# Patient Record
Sex: MALE | Race: BLACK OR AFRICAN AMERICAN | Employment: FULL TIME | URBAN - METROPOLITAN AREA
[De-identification: names, ages, dates, MRNs, and addresses within clinical notes are randomized per-mention and may not be internally consistent; named-entity substitution may affect disease eponyms.]

---

## 2017-01-22 ENCOUNTER — HOSPITAL ENCOUNTER (EMERGENCY)
Age: 42
Discharge: HOME | End: 2017-01-22
Attending: EMERGENCY MEDICINE
Payer: COMMERCIAL

## 2017-01-22 PROCEDURE — 75810000275 HC EMERGENCY DEPT VISIT NO LEVEL OF CARE: Performed by: EMERGENCY MEDICINE

## 2018-08-21 ENCOUNTER — HOSPITAL ENCOUNTER (EMERGENCY)
Age: 43
Discharge: HOME | End: 2018-08-21
Attending: EMERGENCY MEDICINE
Payer: COMMERCIAL

## 2018-08-21 VITALS
BODY MASS INDEX: 31.5 KG/M2 | SYSTOLIC BLOOD PRESSURE: 138 MMHG | HEART RATE: 79 BPM | DIASTOLIC BLOOD PRESSURE: 89 MMHG | HEIGHT: 70 IN | WEIGHT: 220 LBS | OXYGEN SATURATION: 99 % | RESPIRATION RATE: 18 BRPM | TEMPERATURE: 98.3 F

## 2018-08-21 DIAGNOSIS — R73.9 HYPERGLYCEMIA: Primary | ICD-10-CM

## 2018-08-21 LAB
ANION GAP SERPL CALC-SCNC: 9 MMOL/L (ref 7–16)
BUN SERPL-MCNC: 13 MG/DL (ref 6–23)
CALCIUM SERPL-MCNC: 9 MG/DL (ref 8.3–10.4)
CHLORIDE SERPL-SCNC: 99 MMOL/L (ref 98–107)
CO2 SERPL-SCNC: 28 MMOL/L (ref 21–32)
CREAT SERPL-MCNC: 0.95 MG/DL (ref 0.8–1.5)
EST. AVERAGE GLUCOSE BLD GHB EST-MCNC: 237 MG/DL
GLUCOSE BLD STRIP.AUTO-MCNC: 286 MG/DL (ref 65–100)
GLUCOSE SERPL-MCNC: 279 MG/DL (ref 65–100)
HBA1C MFR BLD: 9.9 % (ref 4.8–6)
POTASSIUM SERPL-SCNC: 5 MMOL/L (ref 3.5–5.1)
SODIUM SERPL-SCNC: 136 MMOL/L (ref 136–145)

## 2018-08-21 PROCEDURE — 99283 EMERGENCY DEPT VISIT LOW MDM: CPT | Performed by: EMERGENCY MEDICINE

## 2018-08-21 PROCEDURE — 96374 THER/PROPH/DIAG INJ IV PUSH: CPT | Performed by: EMERGENCY MEDICINE

## 2018-08-21 PROCEDURE — 83036 HEMOGLOBIN GLYCOSYLATED A1C: CPT

## 2018-08-21 PROCEDURE — 80048 BASIC METABOLIC PNL TOTAL CA: CPT

## 2018-08-21 PROCEDURE — 82962 GLUCOSE BLOOD TEST: CPT

## 2018-08-21 PROCEDURE — 74011636637 HC RX REV CODE- 636/637: Performed by: EMERGENCY MEDICINE

## 2018-08-21 RX ORDER — METFORMIN HYDROCHLORIDE 500 MG/1
1000 TABLET ORAL 2 TIMES DAILY WITH MEALS
Qty: 120 TAB | Refills: 0 | Status: SHIPPED | OUTPATIENT
Start: 2018-08-21 | End: 2018-09-20

## 2018-08-21 RX ORDER — BACLOFEN 10 MG/1
TABLET ORAL 3 TIMES DAILY
COMMUNITY

## 2018-08-21 RX ORDER — GLUCOSAMINE SULFATE 1500 MG
POWDER IN PACKET (EA) ORAL DAILY
COMMUNITY

## 2018-08-21 RX ORDER — LANOLIN ALCOHOL/MO/W.PET/CERES
400 CREAM (GRAM) TOPICAL DAILY
COMMUNITY

## 2018-08-21 RX ORDER — AMLODIPINE AND VALSARTAN 10; 320 MG/1; MG/1
1 TABLET ORAL DAILY
COMMUNITY

## 2018-08-21 RX ADMIN — HUMAN INSULIN 10 UNITS: 100 INJECTION, SOLUTION SUBCUTANEOUS at 12:09

## 2018-08-21 NOTE — ED TRIAGE NOTES
Pt states he is diabetic, takes metformin but is still having high glucose levels. Pt states his glucose levels have been elevated for 2 weeks.

## 2018-08-21 NOTE — ED PROVIDER NOTES
[address] Emergency Department  Seen  @ [facility] EMERGENCY DEPT in room FT12/12    Chief Complaint   Patient presents with    High Blood Sugar       HPI:   Arnaldo Vides is a 37 y.o. male who complaints of elevated blood sugar. Patient was seen Dr. Jose Morejon who is retired. Unable to see a follow-up doctor. His blood sugars been elevated for the last 2 weeks. On metformin 500 mg twice a day. Historian: patient    Review of Systems:  No fever or chills. No chest pain or shortness of breath he does have polyuria polydipsia    Past Medical History:  Primary Care Doctor: Nico Mahajan MD (Inactive)    Past Medical History:   Diagnosis Date    Hypertension        History reviewed. No pertinent surgical history. Social History     Social History    Marital status:      Spouse name: N/A    Number of children: N/A    Years of education: N/A     Social History Main Topics    Smoking status: Never Smoker    Smokeless tobacco: Never Used    Alcohol use No    Drug use: No    Sexual activity: Not Asked     Other Topics Concern    None     Social History Narrative       Previous Medications    AMLODIPINE-VALSARTAN (EXFORGE)  MG PER TABLET    Take 1 Tab by mouth daily. BACLOFEN (LIORESAL) 10 MG TABLET    Take  by mouth three (3) times daily. CETIRIZINE (ZYRTEC) 10 MG CAP    Take  by mouth. CETIRIZINE 10 MG CAP    Take  by mouth. CHOLECALCIFEROL (VITAMIN D3) 1,000 UNIT CAP    Take  by mouth daily. FOLIC ACID 667 MCG TABLET    Take 400 mcg by mouth daily. METFORMIN (GLUCOPHAGE) 500 MG TABLET    Take 500 mg by mouth two (2) times daily (with meals). NEBIVOLOL (BYSTOLIC) 5 MG TABLET    Take 5 mg by mouth daily. Allergies   Allergen Reactions    Lisinopril Swelling       Physical Exam:    Vitals:    08/21/18 1049   BP: (!) 143/96   Pulse: 83   Resp: 16   Temp: 98.3 °F (36.8 °C)   SpO2: 98%     Vital signs were reviewed.   Pulse oximetry interpretation: normal    Constitutional: well appearing, nontoxic,   Cardiovascular: regular rate and rhythm, no murmur, 2+ radial pulses,    Respiratory: clear to auscultation with no wheezes, rales, ronchi,   Skin: dry, no rashes,   Neurologic: alert, oriented, answers questions follows commands,   Psychiatric: Speech pattern and content normal,      _____________________________________________________________________  Medical Decision Making: This is a new problem that does need additional workup  Labs/Radiographs/ECG were ordered: yes    The patient's problem is: minor  The Diagnostic Options are: minimal risk  The Management Options are: moderate risk    ______________________________________________________________________  ED Evaluation      No current facility-administered medications for this encounter. Current Outpatient Prescriptions   Medication Sig    Cetirizine (ZYRTEC) 10 mg cap Take  by mouth.  amLODIPine-valsartan (EXFORGE)  mg per tablet Take 1 Tab by mouth daily.  baclofen (LIORESAL) 10 mg tablet Take  by mouth three (3) times daily.  folic acid 267 mcg tablet Take 400 mcg by mouth daily.  cholecalciferol (VITAMIN D3) 1,000 unit cap Take  by mouth daily.  Cetirizine 10 mg cap Take  by mouth.  nebivolol (BYSTOLIC) 5 mg tablet Take 5 mg by mouth daily.  metFORMIN (GLUCOPHAGE) 500 mg tablet Take 500 mg by mouth two (2) times daily (with meals).       ==================================================================  ASSESSMENT: blood sugar elevated pt looks well, given insulin     PLAN: dc home, follow up with PCP  _____________________________________________________________________    Condition:  improved  Disposition:  home  Diagnosis:    Encounter Diagnoses     ICD-10-CM ICD-9-CM   1.  Hyperglycemia R73.9 790.29         Alma Delia Cardoza MD; 8/21/2018 @10:59 AM===========================================    ED Course

## 2018-08-21 NOTE — DISCHARGE INSTRUCTIONS
Learning About High Blood Sugar  What is high blood sugar? Your body turns the food you eat into glucose (sugar), which it uses for energy. But if your body isn't able to use the sugar right away, it can build up in your blood and lead to high blood sugar. When the amount of sugar in your blood stays too high for too much of the time, you may have diabetes. Diabetes is a disease that can cause serious health problems. The good news is that lifestyle changes may help you get your blood sugar back to normal and avoid or delay diabetes. What causes high blood sugar? Sugar (glucose) can build up in your blood if you:  · Are overweight. · Have a family history of diabetes. · Take certain medicines, such as steroids. What are the symptoms? Having high blood sugar may not cause any symptoms at all. Or it may make you feel very thirsty or very hungry. You may also urinate more often than usual, have blurry vision, or lose weight without trying. How is high blood sugar treated? You can take steps to lower your blood sugar level if you understand what makes it get higher. Your doctor may want you to learn how to test your blood sugar level at home. Then you can see how illness, stress, or different kinds of food or medicine raise or lower your blood sugar level. Other tests may be needed to see if you have diabetes. How can you prevent high blood sugar? · Watch your weight. If you're overweight, losing just a small amount of weight may help. Reducing fat around your waist is most important. · Limit the amount of calories, sweets, and unhealthy fat you eat. Ask your doctor if a dietitian can help you. A registered dietitian can help you create meal plans that fit your lifestyle. · Get at least 30 minutes of exercise on most days of the week. Exercise helps control your blood sugar. It also helps you maintain a healthy weight. Walking is a good choice.  You also may want to do other activities, such as running, swimming, cycling, or playing tennis or team sports. · If your doctor prescribed medicines, take them exactly as prescribed. Call your doctor if you think you are having a problem with your medicine. You will get more details on the specific medicines your doctor prescribes. Follow-up care is a key part of your treatment and safety. Be sure to make and go to all appointments, and call your doctor if you are having problems. It's also a good idea to know your test results and keep a list of the medicines you take. Where can you learn more? Go to http://dorothy-hakan.info/. Enter O108 in the search box to learn more about \"Learning About High Blood Sugar. \"  Current as of: December 7, 2017  Content Version: 11.7  © 9271-5710 [employer]. Care instructions adapted under license by Anita Margarita (which disclaims liability or warranty for this information). If you have questions about a medical condition or this instruction, always ask your healthcare professional. [address] any warranty or liability for your use of this information.

## 2018-12-25 ENCOUNTER — HOSPITAL ENCOUNTER (EMERGENCY)
Age: 43
Discharge: HOME | End: 2018-12-25
Attending: EMERGENCY MEDICINE
Payer: COMMERCIAL

## 2018-12-25 VITALS
HEIGHT: 70 IN | WEIGHT: 215 LBS | TEMPERATURE: 97.7 F | DIASTOLIC BLOOD PRESSURE: 84 MMHG | HEART RATE: 87 BPM | BODY MASS INDEX: 30.78 KG/M2 | SYSTOLIC BLOOD PRESSURE: 147 MMHG | RESPIRATION RATE: 18 BRPM | OXYGEN SATURATION: 98 %

## 2018-12-25 DIAGNOSIS — E11.65 TYPE 2 DIABETES MELLITUS WITH HYPERGLYCEMIA, UNSPECIFIED WHETHER LONG TERM INSULIN USE (HCC): ICD-10-CM

## 2018-12-25 DIAGNOSIS — I10 ESSENTIAL HYPERTENSION: Primary | ICD-10-CM

## 2018-12-25 LAB
ALBUMIN SERPL-MCNC: 3.8 G/DL (ref 3.5–5)
ALBUMIN/GLOB SERPL: 1.1 {RATIO}
ALP SERPL-CCNC: 117 U/L (ref 50–136)
ALT SERPL-CCNC: 40 U/L (ref 12–65)
ANION GAP SERPL CALC-SCNC: 7 MMOL/L
AST SERPL-CCNC: 21 U/L (ref 15–37)
BASOPHILS # BLD: 0 K/UL (ref 0–0.2)
BASOPHILS NFR BLD: 0 % (ref 0–2)
BILIRUB SERPL-MCNC: 0.4 MG/DL (ref 0.2–1.1)
BUN SERPL-MCNC: 13 MG/DL (ref 6–23)
CALCIUM SERPL-MCNC: 8.9 MG/DL (ref 8.3–10.4)
CHLORIDE SERPL-SCNC: 104 MMOL/L (ref 98–107)
CO2 SERPL-SCNC: 26 MMOL/L (ref 21–32)
CREAT SERPL-MCNC: 1.05 MG/DL (ref 0.8–1.5)
DIFFERENTIAL METHOD BLD: ABNORMAL
EOSINOPHIL # BLD: 0.4 K/UL (ref 0–0.8)
EOSINOPHIL NFR BLD: 6 % (ref 0.5–7.8)
ERYTHROCYTE [DISTWIDTH] IN BLOOD BY AUTOMATED COUNT: 13.8 % (ref 11.9–14.6)
GLOBULIN SER CALC-MCNC: 3.4 G/DL (ref 2.3–3.5)
GLUCOSE BLD STRIP.AUTO-MCNC: 219 MG/DL (ref 65–100)
GLUCOSE BLD STRIP.AUTO-MCNC: 352 MG/DL (ref 65–100)
GLUCOSE SERPL-MCNC: 336 MG/DL (ref 65–100)
HCT VFR BLD AUTO: 42.3 % (ref 41.1–50.3)
HGB BLD-MCNC: 14.2 G/DL (ref 13.6–17.2)
IMM GRANULOCYTES # BLD: 0 K/UL (ref 0–0.5)
IMM GRANULOCYTES NFR BLD AUTO: 0 % (ref 0–5)
LYMPHOCYTES # BLD: 3.2 K/UL (ref 0.5–4.6)
LYMPHOCYTES NFR BLD: 46 % (ref 13–44)
MCH RBC QN AUTO: 25 PG (ref 26.1–32.9)
MCHC RBC AUTO-ENTMCNC: 33.6 G/DL (ref 31.4–35)
MCV RBC AUTO: 74.6 FL (ref 79.6–97.8)
MONOCYTES # BLD: 0.5 K/UL (ref 0.1–1.3)
MONOCYTES NFR BLD: 7 % (ref 4–12)
NEUTS SEG # BLD: 2.8 K/UL (ref 1.7–8.2)
NEUTS SEG NFR BLD: 40 % (ref 43–78)
NRBC # BLD: 0 K/UL (ref 0–0.2)
PLATELET # BLD AUTO: 362 K/UL (ref 150–450)
PMV BLD AUTO: 9.5 FL (ref 9.4–12.3)
POTASSIUM SERPL-SCNC: 3.8 MMOL/L (ref 3.5–5.1)
PROT SERPL-MCNC: 7.2 G/DL
RBC # BLD AUTO: 5.67 M/UL (ref 4.23–5.6)
SODIUM SERPL-SCNC: 137 MMOL/L (ref 136–145)
WBC # BLD AUTO: 7 K/UL (ref 4.3–11.1)

## 2018-12-25 PROCEDURE — 74011250636 HC RX REV CODE- 250/636: Performed by: EMERGENCY MEDICINE

## 2018-12-25 PROCEDURE — 81003 URINALYSIS AUTO W/O SCOPE: CPT | Performed by: EMERGENCY MEDICINE

## 2018-12-25 PROCEDURE — 85025 COMPLETE CBC W/AUTO DIFF WBC: CPT

## 2018-12-25 PROCEDURE — 99284 EMERGENCY DEPT VISIT MOD MDM: CPT | Performed by: EMERGENCY MEDICINE

## 2018-12-25 PROCEDURE — 74011636637 HC RX REV CODE- 636/637: Performed by: EMERGENCY MEDICINE

## 2018-12-25 PROCEDURE — 82962 GLUCOSE BLOOD TEST: CPT

## 2018-12-25 PROCEDURE — 96374 THER/PROPH/DIAG INJ IV PUSH: CPT | Performed by: EMERGENCY MEDICINE

## 2018-12-25 PROCEDURE — 80053 COMPREHEN METABOLIC PANEL: CPT

## 2018-12-25 RX ORDER — SODIUM CHLORIDE 0.9 % (FLUSH) 0.9 %
5-10 SYRINGE (ML) INJECTION EVERY 8 HOURS
Status: DISCONTINUED | OUTPATIENT
Start: 2018-12-25 | End: 2018-12-25 | Stop reason: HOSPADM

## 2018-12-25 RX ORDER — SODIUM CHLORIDE 0.9 % (FLUSH) 0.9 %
5-10 SYRINGE (ML) INJECTION AS NEEDED
Status: DISCONTINUED | OUTPATIENT
Start: 2018-12-25 | End: 2018-12-25 | Stop reason: HOSPADM

## 2018-12-25 RX ORDER — METFORMIN HYDROCHLORIDE 500 MG/1
TABLET ORAL 2 TIMES DAILY WITH MEALS
COMMUNITY

## 2018-12-25 RX ADMIN — SODIUM CHLORIDE 1000 ML: 900 INJECTION, SOLUTION INTRAVENOUS at 19:34

## 2018-12-25 RX ADMIN — INSULIN HUMAN 6 UNITS: 100 INJECTION, SOLUTION PARENTERAL at 19:33

## 2018-12-25 NOTE — ED PROVIDER NOTES
Patient has a history of hypertension and diabetes, both of which are normally well controlled. He states his baseline. Blood sugar is 80-90. Today, after getting home from [employer], he started feeling dizzy and lightheaded with some blurred vision. He checked his blood pressure and it was 265/99. A family member called his doctor's office and he was told to come here for evaluation. He has not checked his blood sugar today. He has been taking his blood pressure medication and diabetes medications as prescribed. He states he is currently feeling better. Elements of this note were created using speech recognition software. As such, errors of speech recognition may be present. Past Medical History:   Diagnosis Date    Hypertension        History reviewed. No pertinent surgical history. History reviewed. No pertinent family history. Social History     Socioeconomic History    Marital status:      Spouse name: Not on file    Number of children: Not on file    Years of education: Not on file    Highest education level: Not on file   Social Needs    Financial resource strain: Not on file    Food insecurity - worry: Not on file    Food insecurity - inability: Not on file    Transportation needs - medical: Not on file   Decisiv needs - non-medical: Not on file   Occupational History    Not on file   Tobacco Use    Smoking status: Never Smoker    Smokeless tobacco: Never Used   Substance and Sexual Activity    Alcohol use: No    Drug use: No    Sexual activity: Not on file   Other Topics Concern    Not on file   Social History Narrative    Not on file         ALLERGIES: Lisinopril    Review of Systems   Constitutional: Negative for chills and fever. Gastrointestinal: Negative for nausea and vomiting. All other systems reviewed and are negative.       Vitals:    12/25/18 1758 12/25/18 1810   BP: (!) 141/95    Pulse: 85    Resp: 17    Temp: 97.7 °F (36.5 °C) SpO2: 98% 98%   Weight: 97.5 kg (215 lb)    Height: 5' 10\" (1.778 m)             Physical Exam   Constitutional: He is oriented to person, place, and time. He appears well-developed and well-nourished. HENT:   Head: Normocephalic and atraumatic. Eyes: Conjunctivae are normal. Pupils are equal, round, and reactive to light. Neck: Normal range of motion. Neck supple. Cardiovascular: Normal rate, regular rhythm and normal heart sounds. Pulmonary/Chest: Effort normal and breath sounds normal.   Abdominal: Soft. Bowel sounds are normal.   Musculoskeletal: Normal range of motion. He exhibits no edema. Neurological: He is alert and oriented to person, place, and time. Skin: Skin is warm and dry. Nursing note and vitals reviewed. MDM  Number of Diagnoses or Management Options  Essential hypertension: established and worsening  Type 2 diabetes mellitus with hyperglycemia, unspecified whether long term insulin use [facility]):   Diagnosis management comments: 8:32 PM Blood pressure has been in the 140s/80s his entire visit here.   We will recheck his blood sugar after his liter bolus and insulin       Amount and/or Complexity of Data Reviewed  Clinical lab tests: reviewed and ordered    Risk of Complications, Morbidity, and/or Mortality  Presenting problems: moderate  Diagnostic procedures: moderate  Management options: moderate    Patient Progress  Patient progress: improved         Procedures

## 2018-12-26 NOTE — ED NOTES
I have reviewed discharge instructions with the patient. The patient verbalized understanding. Patient left ED via Discharge Method: ambulatory to Home with spouse. Opportunity for questions and clarification provided. Patient given 0 scripts. To continue your aftercare when you leave the hospital, you may receive an automated call from our care team to check in on how you are doing. This is a free service and part of our promise to provide the best care and service to meet your aftercare needs.  If you have questions, or wish to unsubscribe from this service please call [phone]. Thank you for Choosing our [facility] Emergency Department.

## 2023-06-12 PROBLEM — M54.12 LEFT CERVICAL RADICULOPATHY: Status: ACTIVE | Noted: 2018-05-08

## 2023-06-12 PROBLEM — E55.9 VITAMIN D DEFICIENCY: Status: ACTIVE | Noted: 2017-10-04

## 2023-06-12 PROBLEM — R20.0 NUMBNESS AND TINGLING: Status: ACTIVE | Noted: 2018-07-10

## 2023-06-12 PROBLEM — R19.8 ALTERED BOWEL FUNCTION: Status: ACTIVE | Noted: 2022-11-15

## 2023-06-12 PROBLEM — G56.02 CARPAL TUNNEL SYNDROME, LEFT: Status: ACTIVE | Noted: 2018-05-08

## 2023-06-12 PROBLEM — H52.4 PRESBYOPIA: Status: ACTIVE | Noted: 2020-06-17

## 2023-06-12 PROBLEM — M25.561 CHRONIC PAIN OF RIGHT KNEE: Status: ACTIVE | Noted: 2018-01-05

## 2023-06-12 PROBLEM — D64.9 ANEMIA: Status: ACTIVE | Noted: 2021-03-04

## 2023-06-12 PROBLEM — M79.642 PAIN OF LEFT HAND: Status: ACTIVE | Noted: 2018-06-12

## 2023-06-12 PROBLEM — G89.29 CHRONIC PAIN OF RIGHT KNEE: Status: ACTIVE | Noted: 2018-01-05

## 2023-06-12 PROBLEM — R20.2 NUMBNESS AND TINGLING: Status: ACTIVE | Noted: 2018-07-10

## 2023-06-12 PROBLEM — G47.33 OSA (OBSTRUCTIVE SLEEP APNEA): Status: ACTIVE | Noted: 2017-03-30

## 2023-06-12 PROBLEM — A63.0 ANAL CONDYLOMA: Status: ACTIVE | Noted: 2022-11-15

## 2023-06-22 ENCOUNTER — OFFICE VISIT (OUTPATIENT)
Age: 48
End: 2023-06-22

## 2023-06-22 VITALS
HEIGHT: 69 IN | DIASTOLIC BLOOD PRESSURE: 86 MMHG | SYSTOLIC BLOOD PRESSURE: 132 MMHG | WEIGHT: 217 LBS | BODY MASS INDEX: 32.14 KG/M2 | HEART RATE: 98 BPM

## 2023-06-22 DIAGNOSIS — E78.5 DYSLIPIDEMIA: ICD-10-CM

## 2023-06-22 DIAGNOSIS — R07.2 PRECORDIAL PAIN: ICD-10-CM

## 2023-06-22 DIAGNOSIS — E11.9 TYPE 2 DIABETES MELLITUS WITHOUT COMPLICATION, WITHOUT LONG-TERM CURRENT USE OF INSULIN (HCC): ICD-10-CM

## 2023-06-22 DIAGNOSIS — I10 ESSENTIAL HYPERTENSION: Primary | ICD-10-CM

## 2023-06-22 RX ORDER — INSULIN GLARGINE 100 [IU]/ML
24 INJECTION, SOLUTION SUBCUTANEOUS DAILY
COMMUNITY
Start: 2023-06-09

## 2023-06-22 NOTE — PROGRESS NOTES
[address] [place], [place]  [address], [address] [facility], [address] [place] [address]  PHONE: [phone]    SUBJECTIVE: Mine Curry (1975) is a 50 y.o. male seen for a follow up visit regarding the following:   Specialty Problems          Cardiology Problems    Internal hemorrhoids        Essential hypertension        Hyperlipidemia         Pt was referred to the office after an episode of CP and lightheadedness that he went to the urgent care for on 6/12. Pt had never had anything like that before. He has a hx of T2DM on insulin, HTN and HLD. Pt endorses SOB and exertional dyspnea since the episode of CP, but no further CP. Tropinin and d-dimer was negative, EKG was nsr, glucose was elevated. Pt endorses some palpitations that wakes him up from his sleep, pt used to use his cPAP for sleep apnea, but it has been 2 years since he used it last. He is s/p sleep apnea surgery several years ago. Past Medical History, Past Surgical History, Family history, Social History, and Medications were all reviewed with the patient today and updated as necessary. Allergies   Allergen Reactions    Lisinopril Swelling     Past Medical History:   Diagnosis Date    Hypertension      No past surgical history on file. No family history on file.    Social History     Tobacco Use    Smoking status: Never    Smokeless tobacco: Never   Substance Use Topics    Alcohol use: No       Current Outpatient Medications:     insulin glargine (LANTUS SOLOSTAR) 100 UNIT/ML injection pen, Inject 24 Units into the skin daily, Disp: , Rfl:     amLODIPine-valsartan (EXFORGE)  MG per tablet, Take 1 tablet by mouth daily, Disp: , Rfl:     baclofen (LIORESAL) 10 MG tablet, Take by mouth 3 times daily, Disp: , Rfl:     Cetirizine HCl 10 MG CAPS, Take by mouth, Disp: , Rfl:     vitamin D 25 MCG (1000 UT) CAPS, Take by mouth daily, Disp: , Rfl:     metFORMIN (GLUCOPHAGE) 500 MG tablet, Take by mouth 2 times daily (with meals), Disp: , Rfl:

## 2023-07-05 ENCOUNTER — TELEPHONE (OUTPATIENT)
Age: 48
End: 2023-07-05

## 2023-07-11 ENCOUNTER — TELEPHONE (OUTPATIENT)
Age: 48
End: 2023-07-11

## 2023-07-11 NOTE — TELEPHONE ENCOUNTER
Nuclear stress test is normal no evidence of ischemia I see that the echo order is still pending and there does not appear to be an echo results yet done

## 2023-07-11 NOTE — TELEPHONE ENCOUNTER
Pt is calling for test results,he said he called last week and was told Dr Marichuy Baig has not seen the test yet. Pt is very upset and wants someone to call him with results today.   Please call pt at [phone]

## 2023-12-15 PROBLEM — R59.9 ENLARGED LYMPH NODE: Status: ACTIVE | Noted: 2023-12-15

## 2023-12-15 PROBLEM — G54.1: Status: ACTIVE | Noted: 2023-12-15

## 2023-12-15 PROBLEM — I26.99 PULMONARY EMBOLI (HCC): Status: ACTIVE | Noted: 2023-12-15

## 2023-12-15 PROBLEM — R52 UNCONTROLLED PAIN: Status: ACTIVE | Noted: 2023-12-15

## 2023-12-15 PROBLEM — G89.21 CHRONIC PAIN DUE TO TRAUMA: Status: ACTIVE | Noted: 2023-12-15

## 2023-12-15 PROBLEM — G89.29 CHRONIC INTRACTABLE PAIN: Status: ACTIVE | Noted: 2023-12-15

## 2023-12-15 PROBLEM — I16.0 HYPERTENSIVE URGENCY: Status: ACTIVE | Noted: 2023-12-15

## 2023-12-18 PROBLEM — R52 UNCONTROLLED PAIN: Status: RESOLVED | Noted: 2023-12-15 | Resolved: 2023-12-18

## 2023-12-18 PROBLEM — I16.0 HYPERTENSIVE URGENCY: Status: RESOLVED | Noted: 2023-12-15 | Resolved: 2023-12-18

## 2024-01-04 ENCOUNTER — TELEPHONE (OUTPATIENT)
Dept: FAMILY MEDICINE CLINIC | Facility: CLINIC | Age: 49
End: 2024-01-04

## 2024-01-04 ENCOUNTER — TELEPHONE (OUTPATIENT)
Dept: DIABETES SERVICES | Age: 49
End: 2024-01-04

## 2024-01-04 NOTE — TELEPHONE ENCOUNTER
Called patient about telephonic today and apologized for being late as was still with a patient.  He reports he wants to reschedule instead of doing education today.   Reports up all night in pain.  Rescheduled for January 17, 2023.

## 2024-01-04 NOTE — TELEPHONE ENCOUNTER
Dfm:    Welcome to [facility]! We can't wait to meet you!          MD Lucho Corley MD Karla Lester, PA-C Lina Lopez-Meneses, APRN, CNP     Our physicians, nurse practitioner, physician assistant, and care team are here to serve you.    Our office is conveniently located at: [facility] [address].    If you have any questions prior to your appointment, do not hesitate to call our office ([phone]).    [employer] Pre-Check allows you to check in prior to your appointment. This is quick and easy and will expedite your check-in process when you come to the office. Log into [employer] and under the visit select Pre-Check.  If you complete the Pre-Check on [employer] then when you arrive at the clinic you will only be asked your name and date of birth to verify your identity.  We appreciate and look forward to seeing you at your visit.    Sincerely,     [facility]

## 2024-01-17 ENCOUNTER — TELEPHONE (OUTPATIENT)
Dept: DIABETES SERVICES | Age: 49
End: 2024-01-17

## 2024-01-17 NOTE — TELEPHONE ENCOUNTER
Type 2.  Called patient this morning at number requested to call for Basic class education telephonic.  Did not answer and went to voicemail.  Left a message to call me back and gave my office line for him to call back for education or to reschedule if something has come up.

## 2024-02-15 ENCOUNTER — OFFICE VISIT (OUTPATIENT)
Dept: FAMILY MEDICINE CLINIC | Facility: CLINIC | Age: 49
End: 2024-02-15
Payer: COMMERCIAL

## 2024-02-15 VITALS
HEIGHT: 70 IN | OXYGEN SATURATION: 98 % | DIASTOLIC BLOOD PRESSURE: 86 MMHG | SYSTOLIC BLOOD PRESSURE: 128 MMHG | BODY MASS INDEX: 31.57 KG/M2 | HEART RATE: 87 BPM

## 2024-02-15 DIAGNOSIS — E11.65 TYPE 2 DIABETES MELLITUS WITH HYPERGLYCEMIA, WITH LONG-TERM CURRENT USE OF INSULIN (HCC): Chronic | ICD-10-CM

## 2024-02-15 DIAGNOSIS — Z79.4 TYPE 2 DIABETES MELLITUS WITH HYPERGLYCEMIA, WITH LONG-TERM CURRENT USE OF INSULIN (HCC): Chronic | ICD-10-CM

## 2024-02-15 DIAGNOSIS — R91.1 PULMONARY NODULE: ICD-10-CM

## 2024-02-15 DIAGNOSIS — S12.301S CLOSED NONDISPLACED FRACTURE OF FOURTH CERVICAL VERTEBRA, UNSPECIFIED FRACTURE MORPHOLOGY, SEQUELA: ICD-10-CM

## 2024-02-15 DIAGNOSIS — I10 ESSENTIAL HYPERTENSION: ICD-10-CM

## 2024-02-15 DIAGNOSIS — Z86.711 HISTORY OF PULMONARY EMBOLISM: Primary | ICD-10-CM

## 2024-02-15 PROBLEM — R20.0 LEFT SIDED NUMBNESS: Status: ACTIVE | Noted: 2023-08-27

## 2024-02-15 PROBLEM — M79.661 RIGHT CALF PAIN: Status: ACTIVE | Noted: 2023-11-27

## 2024-02-15 PROBLEM — R29.898 WEAKNESS OF BOTH LOWER EXTREMITIES: Status: ACTIVE | Noted: 2023-11-16

## 2024-02-15 PROBLEM — R53.1 WEAKNESS OF LEFT SIDE OF BODY: Status: ACTIVE | Noted: 2023-08-27

## 2024-02-15 PROBLEM — R00.0 SINUS TACHYCARDIA: Status: ACTIVE | Noted: 2023-09-06

## 2024-02-15 PROBLEM — S12.301D CLOSED NONDISPLACED FRACTURE OF FOURTH CERVICAL VERTEBRA WITH ROUTINE HEALING: Status: ACTIVE | Noted: 2023-10-29

## 2024-02-15 PROBLEM — M51.26 LUMBAR DISC HERNIATION: Status: ACTIVE | Noted: 2023-10-14

## 2024-02-15 PROBLEM — M25.532 ACUTE WRIST PAIN, LEFT: Status: ACTIVE | Noted: 2020-07-27

## 2024-02-15 PROBLEM — S12.300A C4 CERVICAL FRACTURE (HCC): Status: ACTIVE | Noted: 2023-08-27

## 2024-02-15 PROCEDURE — 99204 OFFICE O/P NEW MOD 45 MIN: CPT

## 2024-02-15 PROCEDURE — 3074F SYST BP LT 130 MM HG: CPT

## 2024-02-15 PROCEDURE — 3079F DIAST BP 80-89 MM HG: CPT

## 2024-02-15 RX ORDER — OXYCODONE AND ACETAMINOPHEN 7.5; 325 MG/1; MG/1
TABLET ORAL
COMMUNITY
Start: 2023-12-21

## 2024-02-15 RX ORDER — INSULIN GLARGINE 100 [IU]/ML
32 INJECTION, SOLUTION SUBCUTANEOUS NIGHTLY
Qty: 7 ADJUSTABLE DOSE PRE-FILLED PEN SYRINGE | Refills: 3 | Status: SHIPPED | OUTPATIENT
Start: 2024-02-15

## 2024-02-15 RX ORDER — INSULIN LISPRO 100 [IU]/ML
10 INJECTION, SOLUTION INTRAVENOUS; SUBCUTANEOUS
Qty: 10 ML | Refills: 3 | Status: SHIPPED | OUTPATIENT
Start: 2024-02-15

## 2024-02-15 RX ORDER — METOPROLOL SUCCINATE 50 MG/1
50 TABLET, EXTENDED RELEASE ORAL DAILY
Qty: 90 TABLET | Refills: 3 | Status: SHIPPED | OUTPATIENT
Start: 2024-02-15 | End: 2025-02-09

## 2024-02-15 RX ORDER — HYDRALAZINE HYDROCHLORIDE 50 MG/1
50 TABLET, FILM COATED ORAL DAILY
Qty: 90 TABLET | Refills: 3 | Status: SHIPPED | OUTPATIENT
Start: 2024-02-15 | End: 2025-02-09

## 2024-02-15 RX ORDER — HYDROCODONE BITARTRATE AND ACETAMINOPHEN 7.5; 325 MG/1; MG/1
TABLET ORAL
COMMUNITY
Start: 2023-12-20

## 2024-02-15 RX ORDER — OXYCODONE AND ACETAMINOPHEN 10; 325 MG/1; MG/1
TABLET ORAL
COMMUNITY
Start: 2024-01-16

## 2024-02-15 RX ORDER — AMLODIPINE AND VALSARTAN 10; 320 MG/1; MG/1
1 TABLET ORAL DAILY
Qty: 90 TABLET | Refills: 3 | Status: SHIPPED | OUTPATIENT
Start: 2024-02-15

## 2024-02-15 RX ORDER — NAPROXEN 500 MG/1
500 TABLET ORAL 2 TIMES DAILY PRN
Qty: 180 TABLET | Refills: 1 | Status: SHIPPED | OUTPATIENT
Start: 2024-02-15

## 2024-02-15 RX ORDER — INSULIN LISPRO 100 [IU]/ML
1-5 INJECTION, SOLUTION INTRAVENOUS; SUBCUTANEOUS
COMMUNITY
Start: 2024-01-02 | End: 2024-02-15 | Stop reason: SDUPTHER

## 2024-02-15 RX ORDER — INSULIN GLARGINE 100 [IU]/ML
32 INJECTION, SOLUTION SUBCUTANEOUS NIGHTLY
Qty: 10 ML | Status: CANCELLED | OUTPATIENT
Start: 2024-02-15

## 2024-02-15 NOTE — PROGRESS NOTES
compliance with the treatment plan as well as documenting on the day of the visit.    Signed By: Franci Conley, APRN - NP     February 15, 2024

## 2024-02-19 DIAGNOSIS — Z79.4 TYPE 2 DIABETES MELLITUS WITH HYPERGLYCEMIA, WITH LONG-TERM CURRENT USE OF INSULIN (HCC): Primary | ICD-10-CM

## 2024-02-19 DIAGNOSIS — E11.65 TYPE 2 DIABETES MELLITUS WITH HYPERGLYCEMIA, WITH LONG-TERM CURRENT USE OF INSULIN (HCC): Primary | ICD-10-CM

## 2024-02-19 RX ORDER — INSULIN GLARGINE 100 [IU]/ML
32 INJECTION, SOLUTION SUBCUTANEOUS NIGHTLY
Qty: 30 ML | Refills: 3 | Status: SHIPPED | OUTPATIENT
Start: 2024-02-19 | End: 2025-02-28

## 2024-02-21 ENCOUNTER — TELEPHONE (OUTPATIENT)
Dept: FAMILY MEDICINE CLINIC | Facility: CLINIC | Age: 49
End: 2024-02-21

## 2024-02-21 NOTE — TELEPHONE ENCOUNTER
lantus VIAL needs a PA but he got the lantus solostar pen at [facility] instead    Patient got this filled at [facility] [address] [facility]             insulin glargine (LANTUS SOLOSTAR) 100 UNIT/ML injection pen [[id number]]    Order Details  Dose: 32 Units Route: SubCUTAneous Frequency: NIGHTLY   Dispense Quantity: 7 Adjustable Dose Pre-filled Pen Syringe Refills: 3

## 2024-02-27 ENCOUNTER — HOSPITAL ENCOUNTER (EMERGENCY)
Age: 49
Discharge: HOME | End: 2024-02-27
Payer: OTHER MISCELLANEOUS

## 2024-02-27 VITALS
DIASTOLIC BLOOD PRESSURE: 87 MMHG | SYSTOLIC BLOOD PRESSURE: 156 MMHG | RESPIRATION RATE: 18 BRPM | WEIGHT: 225 LBS | TEMPERATURE: 98.2 F | HEART RATE: 110 BPM | HEIGHT: 70 IN | BODY MASS INDEX: 32.21 KG/M2 | OXYGEN SATURATION: 98 %

## 2024-02-27 DIAGNOSIS — M54.50 CHRONIC MIDLINE LOW BACK PAIN WITHOUT SCIATICA: Primary | ICD-10-CM

## 2024-02-27 DIAGNOSIS — G89.29 CHRONIC MIDLINE LOW BACK PAIN WITHOUT SCIATICA: Primary | ICD-10-CM

## 2024-02-27 PROCEDURE — 6360000002 HC RX W HCPCS: Performed by: PHYSICIAN ASSISTANT

## 2024-02-27 PROCEDURE — 6370000000 HC RX 637 (ALT 250 FOR IP): Performed by: PHYSICIAN ASSISTANT

## 2024-02-27 PROCEDURE — 99284 EMERGENCY DEPT VISIT MOD MDM: CPT

## 2024-02-27 PROCEDURE — 96372 THER/PROPH/DIAG INJ SC/IM: CPT

## 2024-02-27 RX ORDER — KETOROLAC TROMETHAMINE 30 MG/ML
60 INJECTION, SOLUTION INTRAMUSCULAR; INTRAVENOUS ONCE
Status: COMPLETED | OUTPATIENT
Start: 2024-02-27 | End: 2024-02-27

## 2024-02-27 RX ORDER — LIDOCAINE 4 G/G
1 PATCH TOPICAL
Status: DISCONTINUED | OUTPATIENT
Start: 2024-02-27 | End: 2024-02-27 | Stop reason: HOSPADM

## 2024-02-27 RX ORDER — MORPHINE SULFATE 15 MG/1
15 TABLET ORAL
Status: COMPLETED | OUTPATIENT
Start: 2024-02-27 | End: 2024-02-27

## 2024-02-27 RX ADMIN — MORPHINE SULFATE 15 MG: 15 TABLET ORAL at 18:09

## 2024-02-27 RX ADMIN — KETOROLAC TROMETHAMINE 60 MG: 30 INJECTION, SOLUTION INTRAMUSCULAR at 18:09

## 2024-02-27 ASSESSMENT — PAIN SCALES - GENERAL: PAINLEVEL_OUTOF10: 10

## 2024-02-27 ASSESSMENT — PAIN DESCRIPTION - FREQUENCY: FREQUENCY: CONTINUOUS

## 2024-02-27 ASSESSMENT — PAIN DESCRIPTION - ONSET: ONSET: ON-GOING

## 2024-02-27 ASSESSMENT — PAIN DESCRIPTION - PAIN TYPE: TYPE: ACUTE PAIN

## 2024-02-27 ASSESSMENT — LIFESTYLE VARIABLES
HOW MANY STANDARD DRINKS CONTAINING ALCOHOL DO YOU HAVE ON A TYPICAL DAY: PATIENT DOES NOT DRINK
HOW OFTEN DO YOU HAVE A DRINK CONTAINING ALCOHOL: NEVER

## 2024-02-27 ASSESSMENT — PAIN DESCRIPTION - LOCATION: LOCATION: BACK

## 2024-02-27 ASSESSMENT — PAIN - FUNCTIONAL ASSESSMENT: PAIN_FUNCTIONAL_ASSESSMENT: 0-10

## 2024-02-27 NOTE — ED PROVIDER NOTES
Never used   Substance and Sexual Activity   • Alcohol use: No   • Drug use: No     Social Determinants of Health     Financial Resource Strain: Low Risk  (2/15/2024)    Overall Financial Resource Strain (CARDIA)    • Difficulty of Paying Living Expenses: Not very hard   Food Insecurity: No Food Insecurity (2/15/2024)    Hunger Vital Sign    • Worried About Running Out of Food in the Last Year: Never true    • Ran Out of Food in the Last Year: Never true   Transportation Needs: Unmet Transportation Needs (2/15/2024)    PRAPARE - Transportation    • Lack of Transportation (Medical): Yes    • Lack of Transportation (Non-Medical): No   Physical Activity: Inactive (2/15/2024)    Exercise Vital Sign    • Days of Exercise per Week: 0 days    • Minutes of Exercise per Session: 0 min   Housing Stability: Low Risk  (2/15/2024)    Housing Stability Vital Sign    • Unable to Pay for Housing in the Last Year: No    • Number of Places Lived in the Last Year: 1    • Unstable Housing in the Last Year: No        Previous Medications    AMLODIPINE-VALSARTAN (EXFORGE)  MG PER TABLET    Take 1 tablet by mouth daily    APIXABAN (ELIQUIS) 5 MG TABS TABLET    Take 1 tablet by mouth 2 times daily    CETIRIZINE HCL 10 MG CAPS    Take by mouth    HYDRALAZINE (APRESOLINE) 50 MG TABLET    Take 1 tablet by mouth daily    HYDROCODONE-ACETAMINOPHEN (NORCO) 7.5-325 MG PER TABLET        INSULIN GLARGINE (LANTUS SOLOSTAR) 100 UNIT/ML INJECTION PEN    Inject 32 Units into the skin nightly    INSULIN GLARGINE (LANTUS) 100 UNIT/ML INJECTION VIAL    Inject 32 Units into the skin nightly    INSULIN LISPRO (HUMALOG) 100 UNIT/ML SOLN INJECTION VIAL    Inject 10 Units into the skin 3 times daily (with meals)    INSULIN PEN NEEDLE 32G X 4 MM MISC    1 each by Does not apply route daily    METFORMIN (GLUCOPHAGE) 500 MG TABLET    Take 1 tablet by mouth 2 times daily (with meals)    METOPROLOL SUCCINATE (TOPROL XL) 50 MG EXTENDED RELEASE TABLET    Take 1

## 2024-02-27 NOTE — DISCHARGE INSTRUCTIONS
Use warm, moist heat to area, massage, gentle range of motion and stretching to area. Use the medication as directed, ED if worse. Follow up with PCP and pain management for recheck and pain medicine refills.

## 2024-02-27 NOTE — ED TRIAGE NOTES
Pt arrives A&Ox4 ambulatory with walker. Pt reports falling down large flight of steel stairs in July. Pt reports increased pain today w/o re-injuring. Pt reports has been seen here before for concern and does have PCP.

## 2024-02-27 NOTE — ED NOTES
I have reviewed discharge instructions with the patient.  The patient verbalized understanding.    Patient left ED via Discharge Method: ambulatory to Home with (insert name of family/friend, self, transport via pov).    Opportunity for questions and clarification provided.       Patient given 0 scripts.         To continue your aftercare when you leave the hospital, you may receive an automated call from our care team to check in on how you are doing.  This is a free service and part of our promise to provide the best care and service to meet your aftercare needs.” If you have questions, or wish to unsubscribe from this service please call [phone].  Thank you for Choosing our [facility] Emergency Department.

## 2024-02-28 ENCOUNTER — HOSPITAL ENCOUNTER (EMERGENCY)
Age: 49
Discharge: HOME | End: 2024-02-28
Attending: EMERGENCY MEDICINE
Payer: OTHER MISCELLANEOUS

## 2024-02-28 VITALS
OXYGEN SATURATION: 97 % | WEIGHT: 225 LBS | DIASTOLIC BLOOD PRESSURE: 102 MMHG | HEIGHT: 70 IN | SYSTOLIC BLOOD PRESSURE: 158 MMHG | HEART RATE: 105 BPM | RESPIRATION RATE: 23 BRPM | TEMPERATURE: 98.4 F | BODY MASS INDEX: 32.21 KG/M2

## 2024-02-28 DIAGNOSIS — M54.50 CHRONIC LOW BACK PAIN, UNSPECIFIED BACK PAIN LATERALITY, UNSPECIFIED WHETHER SCIATICA PRESENT: Primary | ICD-10-CM

## 2024-02-28 DIAGNOSIS — Z86.711 HISTORY OF PULMONARY EMBOLISM: ICD-10-CM

## 2024-02-28 DIAGNOSIS — I10 HYPERTENSION, UNSPECIFIED TYPE: ICD-10-CM

## 2024-02-28 DIAGNOSIS — G89.29 CHRONIC LOW BACK PAIN, UNSPECIFIED BACK PAIN LATERALITY, UNSPECIFIED WHETHER SCIATICA PRESENT: Primary | ICD-10-CM

## 2024-02-28 DIAGNOSIS — I10 ESSENTIAL HYPERTENSION: ICD-10-CM

## 2024-02-28 LAB
ANION GAP SERPL CALC-SCNC: 8 MMOL/L (ref 2–11)
BUN SERPL-MCNC: 18 MG/DL (ref 6–23)
CALCIUM SERPL-MCNC: 9.2 MG/DL (ref 8.3–10.4)
CHLORIDE SERPL-SCNC: 105 MMOL/L (ref 103–113)
CO2 SERPL-SCNC: 24 MMOL/L (ref 21–32)
CREAT SERPL-MCNC: 1.03 MG/DL (ref 0.8–1.5)
EKG ATRIAL RATE: 113 BPM
EKG DIAGNOSIS: ABNORMAL
EKG P AXIS: 62 DEGREES
EKG P-R INTERVAL: 150 MS
EKG Q-T INTERVAL: 316 MS
EKG QRS DURATION: 79 MS
EKG QTC CALCULATION (BAZETT): 434 MS
EKG R AXIS: 54 DEGREES
EKG T AXIS: 34 DEGREES
EKG VENTRICULAR RATE: 113 BPM
GLUCOSE SERPL-MCNC: 177 MG/DL (ref 65–100)
POTASSIUM SERPL-SCNC: 4.5 MMOL/L (ref 3.5–5.1)
SODIUM SERPL-SCNC: 137 MMOL/L (ref 136–146)
TROPONIN I SERPL HS-MCNC: 3.6 PG/ML (ref 0–14)

## 2024-02-28 PROCEDURE — 96374 THER/PROPH/DIAG INJ IV PUSH: CPT

## 2024-02-28 PROCEDURE — 6360000002 HC RX W HCPCS: Performed by: EMERGENCY MEDICINE

## 2024-02-28 PROCEDURE — 84484 ASSAY OF TROPONIN QUANT: CPT

## 2024-02-28 PROCEDURE — 96375 TX/PRO/DX INJ NEW DRUG ADDON: CPT

## 2024-02-28 PROCEDURE — 6370000000 HC RX 637 (ALT 250 FOR IP): Performed by: EMERGENCY MEDICINE

## 2024-02-28 PROCEDURE — 2500000003 HC RX 250 WO HCPCS: Performed by: EMERGENCY MEDICINE

## 2024-02-28 PROCEDURE — 93005 ELECTROCARDIOGRAM TRACING: CPT | Performed by: EMERGENCY MEDICINE

## 2024-02-28 PROCEDURE — 80048 BASIC METABOLIC PNL TOTAL CA: CPT

## 2024-02-28 PROCEDURE — 99284 EMERGENCY DEPT VISIT MOD MDM: CPT

## 2024-02-28 RX ORDER — OXYCODONE HYDROCHLORIDE AND ACETAMINOPHEN 5; 325 MG/1; MG/1
2 TABLET ORAL
Status: COMPLETED | OUTPATIENT
Start: 2024-02-28 | End: 2024-02-28

## 2024-02-28 RX ORDER — HYDRALAZINE HYDROCHLORIDE 20 MG/ML
20 INJECTION INTRAMUSCULAR; INTRAVENOUS ONCE
Status: COMPLETED | OUTPATIENT
Start: 2024-02-28 | End: 2024-02-28

## 2024-02-28 RX ORDER — KETOROLAC TROMETHAMINE 30 MG/ML
30 INJECTION, SOLUTION INTRAMUSCULAR; INTRAVENOUS
Status: COMPLETED | OUTPATIENT
Start: 2024-02-28 | End: 2024-02-28

## 2024-02-28 RX ORDER — METAXALONE 800 MG/1
400-800 TABLET ORAL 3 TIMES DAILY
Qty: 20 TABLET | Refills: 0 | Status: SHIPPED | OUTPATIENT
Start: 2024-02-28

## 2024-02-28 RX ORDER — HYDRALAZINE HYDROCHLORIDE 50 MG/1
50 TABLET, FILM COATED ORAL 4 TIMES DAILY
Qty: 120 TABLET | Refills: 3 | Status: SHIPPED | OUTPATIENT
Start: 2024-02-28 | End: 2025-02-22

## 2024-02-28 RX ORDER — CYCLOBENZAPRINE HCL 10 MG
10 TABLET ORAL
Status: COMPLETED | OUTPATIENT
Start: 2024-02-28 | End: 2024-02-28

## 2024-02-28 RX ORDER — METOPROLOL SUCCINATE 50 MG/1
100 TABLET, EXTENDED RELEASE ORAL DAILY
Qty: 60 TABLET | Refills: 3 | Status: SHIPPED | OUTPATIENT
Start: 2024-02-28 | End: 2025-02-22

## 2024-02-28 RX ORDER — METOPROLOL TARTRATE 1 MG/ML
5 INJECTION, SOLUTION INTRAVENOUS
Status: COMPLETED | OUTPATIENT
Start: 2024-02-28 | End: 2024-02-28

## 2024-02-28 RX ORDER — GABAPENTIN 300 MG/1
300 CAPSULE ORAL 3 TIMES DAILY
Qty: 90 CAPSULE | Refills: 1 | Status: SHIPPED | OUTPATIENT
Start: 2024-02-28 | End: 2024-05-28

## 2024-02-28 RX ADMIN — KETOROLAC TROMETHAMINE 30 MG: 30 INJECTION INTRAMUSCULAR; INTRAVENOUS at 05:20

## 2024-02-28 RX ADMIN — HYDRALAZINE HYDROCHLORIDE 20 MG: 20 INJECTION, SOLUTION INTRAMUSCULAR; INTRAVENOUS at 05:21

## 2024-02-28 RX ADMIN — CYCLOBENZAPRINE 10 MG: 10 TABLET, FILM COATED ORAL at 05:18

## 2024-02-28 RX ADMIN — METOPROLOL TARTRATE 5 MG: 5 INJECTION INTRAVENOUS at 05:53

## 2024-02-28 RX ADMIN — OXYCODONE HYDROCHLORIDE AND ACETAMINOPHEN 2 TABLET: 5; 325 TABLET ORAL at 05:01

## 2024-02-28 ASSESSMENT — PAIN - FUNCTIONAL ASSESSMENT: PAIN_FUNCTIONAL_ASSESSMENT: 0-10

## 2024-02-28 ASSESSMENT — ENCOUNTER SYMPTOMS
FACIAL SWELLING: 0
ABDOMINAL PAIN: 0
BACK PAIN: 1
COUGH: 0
DIARRHEA: 0
NAUSEA: 0
EYE DISCHARGE: 0
RHINORRHEA: 0
VOMITING: 0
COLOR CHANGE: 0
SHORTNESS OF BREATH: 0
EYE REDNESS: 0

## 2024-02-28 ASSESSMENT — PAIN SCALES - GENERAL
PAINLEVEL_OUTOF10: 10
PAINLEVEL_OUTOF10: 10

## 2024-02-28 ASSESSMENT — LIFESTYLE VARIABLES
HOW OFTEN DO YOU HAVE A DRINK CONTAINING ALCOHOL: NEVER
HOW MANY STANDARD DRINKS CONTAINING ALCOHOL DO YOU HAVE ON A TYPICAL DAY: PATIENT DOES NOT DRINK

## 2024-02-28 NOTE — ED PROVIDER NOTES
Emergency Department Provider Note       PCP: Franci Conley, KIERRA - NP   Age: 48 y.o.   Sex: male     DISPOSITION       No diagnosis found.    Medical Decision Making     Chronic back pain, out of his pain medicines for dubious/quizzical reasons.  Patient worried about the effect this is having on his blood pressure.  Will give 1 dose of his standard Percocet 10, some hydralazine IV for his blood pressure with the intention that his dose should be bumped up at home.  Patient will need to follow-up with his PCP or pain management doctor for additional medicines.  ED Course as of 02/28/24 0636   Wed Feb 28, 2024   0615 Patient hesitant to leave stating that he will be back in the same straight since the pain increases in his blood pressure and heart rate go up.  I do see that he was admitted once in December for similar scenario for pain and vital sign control.  I will reach out to the hospitalist to see if they would consider putting him in the hospital again. [JF]   0632 Patient refusing to leave, suggesting that I \"call the police\" so he and his wife can video's \"forcing a crippled man out\".  Patient threatening to ayesha and report me to [place]. [JF]   0635 Wife at bedside I have tried to explain how his heart rate and blood pressure are controllable using hydralazine and Lopressor which she is already on at insufficient doses.  Patient continually interrupting, as I tried to explain that he is not currently in any danger. [JF]      ED Course User Index  [JF] Walt Patel MD     1 or more chronic illnesses with a severe exacerbation or progression.  Prescription drug management performed.  Patient was discharged risks and benefits of hospitalization were considered.  Discussion with external consultants.  Chronic medical problems impacting care include chronic pain.    I independently ordered and reviewed each unique test.  I reviewed external records: ED visit note from an outside group.  I

## 2024-02-28 NOTE — ED NOTES
Day shift Bola MARIE, refused to see pt stating that per MD Patel pt was discharged. Spoke to pt and his wife and he agreed to be discharged and go to another facility for re-evaluation. Assisted pt to car. Pt used walker to walk with stand by assistance.

## 2024-02-28 NOTE — ED NOTES
Pt refusing to leave. Pt states he will not leave until either given a script for pain medication or admitted to the hospital for pain management. Pt states he accidentally threw away the rest of his oxycodone and does not get a refill until next Wednesday. When Dr. Patel went in to speak to the pt, pt and his wife became very upset, stating that the MD did not care about the patient's pain and or health. Pt and wife asked what they are to do about his pain until Wednesday to which Dr. Patel replied \"Endure it and take the neurontin prescribed as well as additional medication to control your pulse and blood pressure.\" Pt states that MD will have to call the police to get him to leave. Security called. Two security officers at bedside. Pt continued to get more upset and broke down into tears. Pt states that security came in and was disrespectful calling him a liar and a drug seeker. (This RN was not in the room to validate this conversation.) CLARENCE Rocha Nurse Supervisor called and it was agreed that pt wait to see/speak to day shift ER attending and hospitalist for a second opinion. Security refused to leave outside pt room despite this RN telling them that they are no longer needed. This RN returned to pt room to speak to pt and his wife. Agreement made to wait until second opinion given by day shift Md's. Informed pt that this RN would update day shift charge nurse on current situation.

## 2024-02-28 NOTE — DISCHARGE INSTRUCTIONS
You will need to follow-up with your pain management group or your primary care doctor for any additional pain pills    Try the skelaxin muscle relaxer to aid with back pain relief  Start the Neurontin to aid with your back pain    Increase your hydralazine to 3, or even 4 times a day.  When this was initially prescribed in December it was supposed to be twice a day, it appears from your current prescription you are only taking it once a day.    Gradually increase the hydralazine frequency to 3 or 4 times a day for blood pressure control  If your heart rate stays high you can take an extra 50 of Toprol/metoprolol/Lopressor

## 2024-02-28 NOTE — ED NOTES
I have reviewed discharge instructions with the patient.  The patient verbalized understanding.    Patient left ED via Discharge Method: ambulatory to Home with wife.    Opportunity for questions and clarification provided.       Patient given 3 scripts.         To continue your aftercare when you leave the hospital, you may receive an automated call from our care team to check in on how you are doing.  This is a free service and part of our promise to provide the best care and service to meet your aftercare needs.” If you have questions, or wish to unsubscribe from this service please call [phone].  Thank you for Choosing our [facility] Emergency Department.

## 2024-03-18 ENCOUNTER — NURSE ONLY (OUTPATIENT)
Dept: FAMILY MEDICINE CLINIC | Facility: CLINIC | Age: 49
End: 2024-03-18

## 2024-03-18 DIAGNOSIS — Z79.4 TYPE 2 DIABETES MELLITUS WITH HYPERGLYCEMIA, WITH LONG-TERM CURRENT USE OF INSULIN (HCC): Chronic | ICD-10-CM

## 2024-03-18 DIAGNOSIS — E11.65 TYPE 2 DIABETES MELLITUS WITH HYPERGLYCEMIA, WITH LONG-TERM CURRENT USE OF INSULIN (HCC): Chronic | ICD-10-CM

## 2024-03-18 DIAGNOSIS — I10 ESSENTIAL HYPERTENSION: ICD-10-CM

## 2024-03-18 LAB
ALBUMIN SERPL-MCNC: 4.2 G/DL (ref 3.5–5)
ALBUMIN/GLOB SERPL: 1.2 (ref 0.4–1.6)
ALP SERPL-CCNC: 112 U/L (ref 50–136)
ALT SERPL-CCNC: 55 U/L (ref 12–65)
ANION GAP SERPL CALC-SCNC: 5 MMOL/L (ref 2–11)
AST SERPL-CCNC: 24 U/L (ref 15–37)
BASOPHILS # BLD: 0 K/UL (ref 0–0.2)
BASOPHILS NFR BLD: 1 % (ref 0–2)
BILIRUB SERPL-MCNC: 0.7 MG/DL (ref 0.2–1.1)
BUN SERPL-MCNC: 14 MG/DL (ref 6–23)
CALCIUM SERPL-MCNC: 9.9 MG/DL (ref 8.3–10.4)
CHLORIDE SERPL-SCNC: 105 MMOL/L (ref 103–113)
CHOLEST SERPL-MCNC: 167 MG/DL
CO2 SERPL-SCNC: 27 MMOL/L (ref 21–32)
CREAT SERPL-MCNC: 1 MG/DL (ref 0.8–1.5)
DIFFERENTIAL METHOD BLD: ABNORMAL
EOSINOPHIL # BLD: 0.2 K/UL (ref 0–0.8)
EOSINOPHIL NFR BLD: 2 % (ref 0.5–7.8)
ERYTHROCYTE [DISTWIDTH] IN BLOOD BY AUTOMATED COUNT: 14.6 % (ref 11.9–14.6)
EST. AVERAGE GLUCOSE BLD GHB EST-MCNC: 209 MG/DL
GLOBULIN SER CALC-MCNC: 3.6 G/DL (ref 2.8–4.5)
GLUCOSE SERPL-MCNC: 130 MG/DL (ref 65–100)
HBA1C MFR BLD: 8.9 % (ref 4.8–5.6)
HCT VFR BLD AUTO: 44.7 % (ref 41.1–50.3)
HDLC SERPL-MCNC: 49 MG/DL (ref 40–60)
HDLC SERPL: 3.4
HGB BLD-MCNC: 14.8 G/DL (ref 13.6–17.2)
IMM GRANULOCYTES # BLD AUTO: 0 K/UL (ref 0–0.5)
IMM GRANULOCYTES NFR BLD AUTO: 0 % (ref 0–5)
LDLC SERPL CALC-MCNC: 101.6 MG/DL
LYMPHOCYTES # BLD: 2.9 K/UL (ref 0.5–4.6)
LYMPHOCYTES NFR BLD: 39 % (ref 13–44)
MCH RBC QN AUTO: 23.8 PG (ref 26.1–32.9)
MCHC RBC AUTO-ENTMCNC: 33.1 G/DL (ref 31.4–35)
MCV RBC AUTO: 71.9 FL (ref 82–102)
MONOCYTES # BLD: 0.6 K/UL (ref 0.1–1.3)
MONOCYTES NFR BLD: 7 % (ref 4–12)
NEUTS SEG # BLD: 3.8 K/UL (ref 1.7–8.2)
NEUTS SEG NFR BLD: 51 % (ref 43–78)
NRBC # BLD: 0 K/UL (ref 0–0.2)
PLATELET # BLD AUTO: 454 K/UL (ref 150–450)
PMV BLD AUTO: 9.5 FL (ref 9.4–12.3)
POTASSIUM SERPL-SCNC: 4.3 MMOL/L (ref 3.5–5.1)
PROT SERPL-MCNC: 7.8 G/DL (ref 6.3–8.2)
RBC # BLD AUTO: 6.22 M/UL (ref 4.23–5.6)
SODIUM SERPL-SCNC: 137 MMOL/L (ref 136–146)
TRIGL SERPL-MCNC: 82 MG/DL (ref 35–150)
VLDLC SERPL CALC-MCNC: 16.4 MG/DL (ref 6–23)
WBC # BLD AUTO: 7.5 K/UL (ref 4.3–11.1)

## 2024-03-20 DIAGNOSIS — Z79.4 TYPE 2 DIABETES MELLITUS WITH HYPERGLYCEMIA, WITH LONG-TERM CURRENT USE OF INSULIN (HCC): ICD-10-CM

## 2024-03-20 DIAGNOSIS — E11.65 TYPE 2 DIABETES MELLITUS WITH HYPERGLYCEMIA, WITH LONG-TERM CURRENT USE OF INSULIN (HCC): ICD-10-CM

## 2024-03-20 DIAGNOSIS — E11.65 TYPE 2 DIABETES MELLITUS WITH HYPERGLYCEMIA, WITH LONG-TERM CURRENT USE OF INSULIN (HCC): Chronic | ICD-10-CM

## 2024-03-20 DIAGNOSIS — Z79.4 TYPE 2 DIABETES MELLITUS WITH HYPERGLYCEMIA, WITH LONG-TERM CURRENT USE OF INSULIN (HCC): Chronic | ICD-10-CM

## 2024-03-20 RX ORDER — INSULIN GLARGINE 100 [IU]/ML
35 INJECTION, SOLUTION SUBCUTANEOUS NIGHTLY
Qty: 7 ADJUSTABLE DOSE PRE-FILLED PEN SYRINGE | Refills: 5 | Status: SHIPPED | OUTPATIENT
Start: 2024-03-20

## 2024-03-20 RX ORDER — INSULIN GLARGINE 100 [IU]/ML
32 INJECTION, SOLUTION SUBCUTANEOUS NIGHTLY
Qty: 30 ML | Refills: 3 | OUTPATIENT
Start: 2024-03-20 | End: 2025-03-30

## 2024-03-27 ENCOUNTER — OFFICE VISIT (OUTPATIENT)
Dept: FAMILY MEDICINE CLINIC | Facility: CLINIC | Age: 49
End: 2024-03-27
Payer: COMMERCIAL

## 2024-03-27 VITALS
RESPIRATION RATE: 16 BRPM | OXYGEN SATURATION: 97 % | SYSTOLIC BLOOD PRESSURE: 132 MMHG | WEIGHT: 227 LBS | TEMPERATURE: 97.6 F | HEART RATE: 119 BPM | DIASTOLIC BLOOD PRESSURE: 86 MMHG | BODY MASS INDEX: 32.5 KG/M2 | HEIGHT: 70 IN

## 2024-03-27 DIAGNOSIS — Z79.4 TYPE 2 DIABETES MELLITUS WITH HYPERGLYCEMIA, WITH LONG-TERM CURRENT USE OF INSULIN (HCC): Chronic | ICD-10-CM

## 2024-03-27 DIAGNOSIS — E11.65 TYPE 2 DIABETES MELLITUS WITH HYPERGLYCEMIA, WITH LONG-TERM CURRENT USE OF INSULIN (HCC): Chronic | ICD-10-CM

## 2024-03-27 DIAGNOSIS — R79.89 ABNORMAL CBC: Primary | ICD-10-CM

## 2024-03-27 LAB
BASOPHILS # BLD: 0 K/UL (ref 0–0.2)
BASOPHILS NFR BLD: 1 % (ref 0–2)
DIFFERENTIAL METHOD BLD: ABNORMAL
EOSINOPHIL # BLD: 0.2 K/UL (ref 0–0.8)
EOSINOPHIL NFR BLD: 3 % (ref 0.5–7.8)
ERYTHROCYTE [DISTWIDTH] IN BLOOD BY AUTOMATED COUNT: 14.6 % (ref 11.9–14.6)
HCT VFR BLD AUTO: 42.2 % (ref 41.1–50.3)
HGB BLD-MCNC: 14.2 G/DL (ref 13.6–17.2)
IMM GRANULOCYTES # BLD AUTO: 0.1 K/UL (ref 0–0.5)
IMM GRANULOCYTES NFR BLD AUTO: 1 % (ref 0–5)
LYMPHOCYTES # BLD: 3 K/UL (ref 0.5–4.6)
LYMPHOCYTES NFR BLD: 37 % (ref 13–44)
MCH RBC QN AUTO: 23.9 PG (ref 26.1–32.9)
MCHC RBC AUTO-ENTMCNC: 33.6 G/DL (ref 31.4–35)
MCV RBC AUTO: 71 FL (ref 82–102)
MONOCYTES # BLD: 0.8 K/UL (ref 0.1–1.3)
MONOCYTES NFR BLD: 10 % (ref 4–12)
NEUTS SEG # BLD: 4 K/UL (ref 1.7–8.2)
NEUTS SEG NFR BLD: 48 % (ref 43–78)
NRBC # BLD: 0 K/UL (ref 0–0.2)
PLATELET # BLD AUTO: 399 K/UL (ref 150–450)
PMV BLD AUTO: 9.7 FL (ref 9.4–12.3)
RBC # BLD AUTO: 5.94 M/UL (ref 4.23–5.6)
WBC # BLD AUTO: 8.1 K/UL (ref 4.3–11.1)

## 2024-03-27 PROCEDURE — 99213 OFFICE O/P EST LOW 20 MIN: CPT

## 2024-03-27 PROCEDURE — 3075F SYST BP GE 130 - 139MM HG: CPT

## 2024-03-27 PROCEDURE — 3079F DIAST BP 80-89 MM HG: CPT

## 2024-03-27 PROCEDURE — 3052F HG A1C>EQUAL 8.0%<EQUAL 9.0%: CPT

## 2024-03-27 RX ORDER — INSULIN GLARGINE 100 [IU]/ML
35 INJECTION, SOLUTION SUBCUTANEOUS NIGHTLY
Qty: 7 ADJUSTABLE DOSE PRE-FILLED PEN SYRINGE | Refills: 5 | Status: SHIPPED | OUTPATIENT
Start: 2024-03-27

## 2024-03-27 ASSESSMENT — ENCOUNTER SYMPTOMS
ALLERGIC/IMMUNOLOGIC NEGATIVE: 1
GASTROINTESTINAL NEGATIVE: 1
EYES NEGATIVE: 1
BACK PAIN: 1
RESPIRATORY NEGATIVE: 1

## 2024-03-27 NOTE — PROGRESS NOTES
.......    Subjective:     Bob Gonzales 1975 is a 48 y.o. male Established patient, here for evaluation of the following:   Chief Complaint   Patient presents with    Annual Exam       Mr. Rojas presents to the clinic to have CBC redrawn as RBC and platelet count was elevated last time. His pulse rate is high because he walked in from the car to the room and aggravated his pain a lot. He has not been sick, has not had SOB, issues with urination, cough, headache.   He continues to see neurosurgery and pain management. He has a home aid and he is working with physical therapy weekly. He has had no low blood sugars, did tell him to make sure he is checking blood sugars before and after PT.        Past Medical History:   Diagnosis Date    Asthma     Erectile dysfunction     Hx of blood clots     Hyperlipidemia     Hypertension     Obesity     Sleep apnea     Type 2 diabetes mellitus without complication (HCC)      History reviewed. No pertinent surgical history.   History reviewed. No pertinent family history.  Social History     Tobacco Use    Smoking status: Never    Smokeless tobacco: Never   Substance Use Topics    Alcohol use: No      Current Outpatient Medications   Medication Sig Dispense Refill    insulin glargine (LANTUS SOLOSTAR) 100 UNIT/ML injection pen Inject 35 Units into the skin nightly 7 Adjustable Dose Pre-filled Pen Syringe 5    hydrALAZINE (APRESOLINE) 50 MG tablet Take 1 tablet by mouth 4 times daily You can take this up to 4 times a day roughly every 6 hours if needed, 120 tablet 3    metaxalone (SKELAXIN) 800 MG tablet Take 0.5-1 tablets by mouth 3 times daily 20 tablet 0    metoprolol succinate (TOPROL XL) 50 MG extended release tablet Take 2 tablets by mouth daily 60 tablet 3    oxyCODONE-acetaminophen (PERCOCET)  MG per tablet       naproxen (NAPROSYN) 500 MG tablet Take 1 tablet by mouth 2 times daily as needed for Pain 180 tablet 1    amLODIPine-valsartan (EXFORGE)  MG

## 2024-04-09 ENCOUNTER — OFFICE VISIT (OUTPATIENT)
Dept: PULMONOLOGY | Age: 49
End: 2024-04-09
Payer: COMMERCIAL

## 2024-04-09 VITALS
HEART RATE: 107 BPM | BODY MASS INDEX: 32.5 KG/M2 | WEIGHT: 227 LBS | DIASTOLIC BLOOD PRESSURE: 102 MMHG | OXYGEN SATURATION: 97 % | HEIGHT: 70 IN | RESPIRATION RATE: 18 BRPM | TEMPERATURE: 98.1 F | SYSTOLIC BLOOD PRESSURE: 164 MMHG

## 2024-04-09 DIAGNOSIS — G47.33 OSA (OBSTRUCTIVE SLEEP APNEA): ICD-10-CM

## 2024-04-09 DIAGNOSIS — Z86.711 HISTORY OF PULMONARY EMBOLISM: ICD-10-CM

## 2024-04-09 DIAGNOSIS — Z01.811 PREOP PULMONARY/RESPIRATORY EXAM: Primary | ICD-10-CM

## 2024-04-09 DIAGNOSIS — Z79.01 CHRONIC ANTICOAGULATION: ICD-10-CM

## 2024-04-09 PROBLEM — I26.99 PULMONARY EMBOLI (HCC): Status: RESOLVED | Noted: 2023-12-15 | Resolved: 2024-04-09

## 2024-04-09 PROCEDURE — 99204 OFFICE O/P NEW MOD 45 MIN: CPT | Performed by: INTERNAL MEDICINE

## 2024-04-09 PROCEDURE — 3077F SYST BP >= 140 MM HG: CPT | Performed by: INTERNAL MEDICINE

## 2024-04-09 PROCEDURE — 3080F DIAST BP >= 90 MM HG: CPT | Performed by: INTERNAL MEDICINE

## 2024-04-09 RX ORDER — CYCLOBENZAPRINE HCL 5 MG
TABLET ORAL
COMMUNITY

## 2024-04-09 RX ORDER — TRAMADOL HYDROCHLORIDE 50 MG/1
TABLET ORAL
COMMUNITY

## 2024-04-09 NOTE — PROGRESS NOTES
Apparatus: EKG wires overlie the chest    Impression  No evidence to suggest acute cardiopulmonary abnormality.    Signed by: 2/28/2024 8:30 AM: Dennys Licea MD    CT Chest: clear lungs, no PE or significant lung nodules    CTA CHEST ABDOMEN PELVIS W WO CONTRAST 12/15/2023    Narrative  Title:  CTA  of the chest, abdomen and pelvis.    Indication:  Reason for exam:->CP, upper back pain, Looking for dissection CP,  upper back pain, Looking for dissection    Technique: Axial images of the chest, abdomen and pelvis were obtained before  and after the administration of 100 ml Isovue 370 intravenous iodinated contrast  media. Contrast was used to best identify solid structures.  Images also viewed  on an independent workstation including 3D rendering.    All CT scans at this facility are performed using dose reduction/dose modulation  techniques, as appropriate the performed exam, including the following:  Automated Exposure Control; Adjustment of the mA and/or kV according to patient  size (this includes techniques or standardized protocols for targeted exams  where dose is matched to indication/reason for exam); and Use of Iterative  Reconstruction Technique.    Comparison: None    Findings:    Neck base: Normal  Lungs: Normal  Mediastinum: Normal.  Cardiac: Normal.  Pulmonary Arteries:  Normal  Esophagus: Normal  CHEST WALL: Normal  Liver:Normal  Biliary:Normal  Pancreas:Normal  Spleen:Normal  Adrenals:Normal  Kidneys:Normal  Lymph nodes: 17 mm diameter right axillary lymph node of unclear clinical  significance.  Abdominal wall:Vivian  Bowel:Normal normal appendix  Pelvic organs: Enlarged prostate gland  Bones:Normal for age    Aorta:  Normal  Vascular/Other: Patent mesenteric and renal arteries. No obvious coronary  calcification    Impression  No acute process.    Mildly prominent 17 mm lymph node in the right axilla. Unknown clinical  significance. 3-month follow-up right axillary ultrasound recommended

## 2024-04-16 ENCOUNTER — TELEPHONE (OUTPATIENT)
Dept: FAMILY MEDICINE CLINIC | Facility: CLINIC | Age: 49
End: 2024-04-16

## 2024-04-16 DIAGNOSIS — E11.65 TYPE 2 DIABETES MELLITUS WITH HYPERGLYCEMIA, WITH LONG-TERM CURRENT USE OF INSULIN (HCC): Chronic | ICD-10-CM

## 2024-04-16 DIAGNOSIS — Z79.4 TYPE 2 DIABETES MELLITUS WITH HYPERGLYCEMIA, WITH LONG-TERM CURRENT USE OF INSULIN (HCC): Chronic | ICD-10-CM

## 2024-04-16 DIAGNOSIS — I10 ESSENTIAL HYPERTENSION: ICD-10-CM

## 2024-04-16 RX ORDER — INSULIN LISPRO 100 [IU]/ML
10 INJECTION, SOLUTION INTRAVENOUS; SUBCUTANEOUS
Qty: 10 ML | Refills: 3 | Status: SHIPPED | OUTPATIENT
Start: 2024-04-16

## 2024-04-16 RX ORDER — AMLODIPINE AND VALSARTAN 10; 320 MG/1; MG/1
1 TABLET ORAL DAILY
Qty: 90 TABLET | Refills: 3 | Status: SHIPPED | OUTPATIENT
Start: 2024-04-16

## 2024-04-16 NOTE — TELEPHONE ENCOUNTER
Amlodipine  mg 1 tablet by mouth daily    Insulin lispro 100 unit/ml soln injection vial 35 units into skin nightly     Insulin pen needle 32gx 4mm misc  10 units 3 times a day.     Pt would like for these medications to be called in, he has a new pt appt on June 26.      [facility].  On file.    Thank you

## 2024-05-24 DIAGNOSIS — Z86.711 HISTORY OF PULMONARY EMBOLISM: ICD-10-CM

## 2024-05-24 NOTE — TELEPHONE ENCOUNTER
Requested Prescriptions     Pending Prescriptions Disp Refills    apixaban (ELIQUIS) 5 MG TABS tablet 180 tablet 1     Sig: Take 1 tablet by mouth 2 times daily           Verified rx. Last OV 6/22/23. Erx to pharm on file. Has an appointment scheduled for June

## 2024-07-04 SDOH — HEALTH STABILITY: PHYSICAL HEALTH: ON AVERAGE, HOW MANY MINUTES DO YOU ENGAGE IN EXERCISE AT THIS LEVEL?: 0 MIN

## 2024-07-04 SDOH — HEALTH STABILITY: PHYSICAL HEALTH: ON AVERAGE, HOW MANY DAYS PER WEEK DO YOU ENGAGE IN MODERATE TO STRENUOUS EXERCISE (LIKE A BRISK WALK)?: 0 DAYS

## 2024-07-05 ENCOUNTER — OFFICE VISIT (OUTPATIENT)
Dept: FAMILY MEDICINE CLINIC | Facility: CLINIC | Age: 49
End: 2024-07-05
Payer: COMMERCIAL

## 2024-07-05 VITALS
HEART RATE: 112 BPM | HEIGHT: 70 IN | DIASTOLIC BLOOD PRESSURE: 80 MMHG | TEMPERATURE: 98.4 F | OXYGEN SATURATION: 96 % | WEIGHT: 220 LBS | SYSTOLIC BLOOD PRESSURE: 130 MMHG | BODY MASS INDEX: 31.5 KG/M2

## 2024-07-05 DIAGNOSIS — M25.551 RIGHT HIP PAIN: ICD-10-CM

## 2024-07-05 DIAGNOSIS — H54.7 VISION LOSS: ICD-10-CM

## 2024-07-05 DIAGNOSIS — E55.9 VITAMIN D DEFICIENCY: ICD-10-CM

## 2024-07-05 DIAGNOSIS — Z79.4 TYPE 2 DIABETES MELLITUS WITH HYPERGLYCEMIA, WITH LONG-TERM CURRENT USE OF INSULIN (HCC): Chronic | ICD-10-CM

## 2024-07-05 DIAGNOSIS — E11.65 TYPE 2 DIABETES MELLITUS WITH HYPERGLYCEMIA, WITH LONG-TERM CURRENT USE OF INSULIN (HCC): Chronic | ICD-10-CM

## 2024-07-05 DIAGNOSIS — Z86.711 HISTORY OF PULMONARY EMBOLISM: ICD-10-CM

## 2024-07-05 DIAGNOSIS — G54.1 NEUROPATHY, LUMBOSACRAL PLEXUS: ICD-10-CM

## 2024-07-05 DIAGNOSIS — E11.39 DIABETIC EYES (HCC): ICD-10-CM

## 2024-07-05 DIAGNOSIS — G47.33 OSA (OBSTRUCTIVE SLEEP APNEA): Primary | ICD-10-CM

## 2024-07-05 DIAGNOSIS — S12.301S CLOSED NONDISPLACED FRACTURE OF FOURTH CERVICAL VERTEBRA, UNSPECIFIED FRACTURE MORPHOLOGY, SEQUELA: ICD-10-CM

## 2024-07-05 DIAGNOSIS — E78.49 OTHER HYPERLIPIDEMIA: ICD-10-CM

## 2024-07-05 DIAGNOSIS — Z12.5 SCREENING FOR PROSTATE CANCER: ICD-10-CM

## 2024-07-05 DIAGNOSIS — Z91.89 SEDENTARY LIFESTYLE: ICD-10-CM

## 2024-07-05 DIAGNOSIS — I10 ESSENTIAL HYPERTENSION: ICD-10-CM

## 2024-07-05 LAB
25(OH)D3 SERPL-MCNC: 21.2 NG/ML (ref 30–100)
ALBUMIN SERPL-MCNC: 4.1 G/DL (ref 3.5–5)
ALBUMIN/GLOB SERPL: 1.4 (ref 1–1.9)
ALP SERPL-CCNC: 120 U/L (ref 40–129)
ALT SERPL-CCNC: 58 U/L (ref 12–65)
ANION GAP SERPL CALC-SCNC: 12 MMOL/L (ref 9–18)
AST SERPL-CCNC: 34 U/L (ref 15–37)
BASOPHILS # BLD: 0.1 K/UL (ref 0–0.2)
BASOPHILS NFR BLD: 1 % (ref 0–2)
BILIRUB SERPL-MCNC: 0.3 MG/DL (ref 0–1.2)
BUN SERPL-MCNC: 17 MG/DL (ref 6–23)
CALCIUM SERPL-MCNC: 9.9 MG/DL (ref 8.8–10.2)
CHLORIDE SERPL-SCNC: 99 MMOL/L (ref 98–107)
CHOLEST SERPL-MCNC: 168 MG/DL (ref 0–200)
CO2 SERPL-SCNC: 23 MMOL/L (ref 20–28)
CREAT SERPL-MCNC: 0.9 MG/DL (ref 0.8–1.3)
DIFFERENTIAL METHOD BLD: ABNORMAL
EOSINOPHIL # BLD: 0.2 K/UL (ref 0–0.8)
EOSINOPHIL NFR BLD: 2 % (ref 0.5–7.8)
ERYTHROCYTE [DISTWIDTH] IN BLOOD BY AUTOMATED COUNT: 15 % (ref 11.9–14.6)
EST. AVERAGE GLUCOSE BLD GHB EST-MCNC: 246 MG/DL
GLOBULIN SER CALC-MCNC: 3 G/DL (ref 2.3–3.5)
GLUCOSE SERPL-MCNC: 251 MG/DL (ref 70–99)
HBA1C MFR BLD: 10.2 % (ref 0–5.6)
HCT VFR BLD AUTO: 44.8 % (ref 41.1–50.3)
HDLC SERPL-MCNC: 52 MG/DL (ref 40–60)
HDLC SERPL: 3.2 (ref 0–5)
HGB BLD-MCNC: 14.9 G/DL (ref 13.6–17.2)
IMM GRANULOCYTES # BLD AUTO: 0 K/UL (ref 0–0.5)
IMM GRANULOCYTES NFR BLD AUTO: 1 % (ref 0–5)
LDLC SERPL CALC-MCNC: 101 MG/DL (ref 0–100)
LYMPHOCYTES # BLD: 3.1 K/UL (ref 0.5–4.6)
LYMPHOCYTES NFR BLD: 35 % (ref 13–44)
MCH RBC QN AUTO: 24 PG (ref 26.1–32.9)
MCHC RBC AUTO-ENTMCNC: 33.3 G/DL (ref 31.4–35)
MCV RBC AUTO: 72.1 FL (ref 82–102)
MONOCYTES # BLD: 0.8 K/UL (ref 0.1–1.3)
MONOCYTES NFR BLD: 10 % (ref 4–12)
NEUTS SEG # BLD: 4.6 K/UL (ref 1.7–8.2)
NEUTS SEG NFR BLD: 51 % (ref 43–78)
NRBC # BLD: 0 K/UL (ref 0–0.2)
PLATELET # BLD AUTO: 428 K/UL (ref 150–450)
PMV BLD AUTO: 9.5 FL (ref 9.4–12.3)
POTASSIUM SERPL-SCNC: 4.6 MMOL/L (ref 3.5–5.1)
PROT SERPL-MCNC: 7.1 G/DL (ref 6.3–8.2)
PSA SERPL-MCNC: 1.7 NG/ML (ref 0–4)
RBC # BLD AUTO: 6.21 M/UL (ref 4.23–5.6)
SODIUM SERPL-SCNC: 134 MMOL/L (ref 136–145)
TRIGL SERPL-MCNC: 75 MG/DL (ref 0–150)
VLDLC SERPL CALC-MCNC: 15 MG/DL (ref 6–23)
WBC # BLD AUTO: 8.8 K/UL (ref 4.3–11.1)

## 2024-07-05 PROCEDURE — 99214 OFFICE O/P EST MOD 30 MIN: CPT | Performed by: FAMILY MEDICINE

## 2024-07-05 PROCEDURE — 3075F SYST BP GE 130 - 139MM HG: CPT | Performed by: FAMILY MEDICINE

## 2024-07-05 PROCEDURE — 3079F DIAST BP 80-89 MM HG: CPT | Performed by: FAMILY MEDICINE

## 2024-07-05 PROCEDURE — 3046F HEMOGLOBIN A1C LEVEL >9.0%: CPT | Performed by: FAMILY MEDICINE

## 2024-07-05 RX ORDER — INSULIN LISPRO 100 [IU]/ML
10 INJECTION, SOLUTION INTRAVENOUS; SUBCUTANEOUS
Qty: 10 ML | Refills: 3 | Status: SHIPPED | OUTPATIENT
Start: 2024-07-05

## 2024-07-05 RX ORDER — INSULIN GLARGINE 100 [IU]/ML
35 INJECTION, SOLUTION SUBCUTANEOUS NIGHTLY
Qty: 7 ADJUSTABLE DOSE PRE-FILLED PEN SYRINGE | Refills: 5 | Status: SHIPPED | OUTPATIENT
Start: 2024-07-05

## 2024-07-05 RX ORDER — AMLODIPINE AND VALSARTAN 10; 320 MG/1; MG/1
1 TABLET ORAL DAILY
Qty: 90 TABLET | Refills: 3 | Status: SHIPPED | OUTPATIENT
Start: 2024-07-05

## 2024-07-05 RX ORDER — NAPROXEN 500 MG/1
500 TABLET ORAL 2 TIMES DAILY PRN
Qty: 180 TABLET | Refills: 1 | Status: SHIPPED | OUTPATIENT
Start: 2024-07-05

## 2024-07-30 DIAGNOSIS — E11.65 TYPE 2 DIABETES MELLITUS WITH HYPERGLYCEMIA, WITH LONG-TERM CURRENT USE OF INSULIN (HCC): Chronic | ICD-10-CM

## 2024-07-30 DIAGNOSIS — Z79.4 TYPE 2 DIABETES MELLITUS WITH HYPERGLYCEMIA, WITH LONG-TERM CURRENT USE OF INSULIN (HCC): Chronic | ICD-10-CM

## 2024-07-30 RX ORDER — INSULIN LISPRO 100 [IU]/ML
INJECTION, SOLUTION INTRAVENOUS; SUBCUTANEOUS
Qty: 10 ML | Refills: 0 | Status: SHIPPED | OUTPATIENT
Start: 2024-07-30

## 2024-09-25 ENCOUNTER — OFFICE VISIT (OUTPATIENT)
Dept: FAMILY MEDICINE CLINIC | Facility: CLINIC | Age: 49
End: 2024-09-25
Payer: COMMERCIAL

## 2024-09-25 VITALS
TEMPERATURE: 97.3 F | SYSTOLIC BLOOD PRESSURE: 130 MMHG | BODY MASS INDEX: 35.76 KG/M2 | DIASTOLIC BLOOD PRESSURE: 100 MMHG | OXYGEN SATURATION: 97 % | HEIGHT: 70 IN | WEIGHT: 249.8 LBS | HEART RATE: 125 BPM

## 2024-09-25 DIAGNOSIS — M51.36 LUMBAR DEGENERATIVE DISC DISEASE: ICD-10-CM

## 2024-09-25 DIAGNOSIS — I10 ESSENTIAL HYPERTENSION: ICD-10-CM

## 2024-09-25 DIAGNOSIS — E78.49 OTHER HYPERLIPIDEMIA: Primary | ICD-10-CM

## 2024-09-25 DIAGNOSIS — E55.9 VITAMIN D DEFICIENCY: ICD-10-CM

## 2024-09-25 DIAGNOSIS — E11.9 TYPE 2 DIABETES MELLITUS WITHOUT COMPLICATION, UNSPECIFIED WHETHER LONG TERM INSULIN USE (HCC): ICD-10-CM

## 2024-09-25 DIAGNOSIS — F11.93 ACUTE OPIOID WITHDRAWAL (HCC): ICD-10-CM

## 2024-09-25 LAB
25(OH)D3 SERPL-MCNC: 23.2 NG/ML (ref 30–100)
ALBUMIN SERPL-MCNC: 3.8 G/DL (ref 3.5–5)
ALBUMIN/GLOB SERPL: 1.1 (ref 1–1.9)
ALP SERPL-CCNC: 128 U/L (ref 40–129)
ALT SERPL-CCNC: 52 U/L (ref 8–55)
ANION GAP SERPL CALC-SCNC: 12 MMOL/L (ref 9–18)
AST SERPL-CCNC: 35 U/L (ref 15–37)
BASOPHILS # BLD: 0 K/UL (ref 0–0.2)
BASOPHILS NFR BLD: 1 % (ref 0–2)
BILIRUB SERPL-MCNC: 0.2 MG/DL (ref 0–1.2)
BUN SERPL-MCNC: 12 MG/DL (ref 6–23)
CALCIUM SERPL-MCNC: 9.5 MG/DL (ref 8.8–10.2)
CHLORIDE SERPL-SCNC: 99 MMOL/L (ref 98–107)
CHOLEST SERPL-MCNC: 150 MG/DL (ref 0–200)
CO2 SERPL-SCNC: 23 MMOL/L (ref 20–28)
CREAT SERPL-MCNC: 0.83 MG/DL (ref 0.8–1.3)
DIFFERENTIAL METHOD BLD: ABNORMAL
EOSINOPHIL # BLD: 0.3 K/UL (ref 0–0.8)
EOSINOPHIL NFR BLD: 4 % (ref 0.5–7.8)
ERYTHROCYTE [DISTWIDTH] IN BLOOD BY AUTOMATED COUNT: 15.6 % (ref 11.9–14.6)
EST. AVERAGE GLUCOSE BLD GHB EST-MCNC: 318 MG/DL
GLOBULIN SER CALC-MCNC: 3.3 G/DL (ref 2.3–3.5)
GLUCOSE SERPL-MCNC: 343 MG/DL (ref 70–99)
HBA1C MFR BLD: 12.7 % (ref 0–5.6)
HCT VFR BLD AUTO: 42 % (ref 41.1–50.3)
HDLC SERPL-MCNC: 53 MG/DL (ref 40–60)
HDLC SERPL: 2.8 (ref 0–5)
HGB BLD-MCNC: 13.8 G/DL (ref 13.6–17.2)
IMM GRANULOCYTES # BLD AUTO: 0 K/UL (ref 0–0.5)
IMM GRANULOCYTES NFR BLD AUTO: 0 % (ref 0–5)
LDLC SERPL CALC-MCNC: 80 MG/DL (ref 0–100)
LYMPHOCYTES # BLD: 2.6 K/UL (ref 0.5–4.6)
LYMPHOCYTES NFR BLD: 37 % (ref 13–44)
MCH RBC QN AUTO: 23.7 PG (ref 26.1–32.9)
MCHC RBC AUTO-ENTMCNC: 32.9 G/DL (ref 31.4–35)
MCV RBC AUTO: 72 FL (ref 82–102)
MONOCYTES # BLD: 0.8 K/UL (ref 0.1–1.3)
MONOCYTES NFR BLD: 12 % (ref 4–12)
NEUTS SEG # BLD: 3.3 K/UL (ref 1.7–8.2)
NEUTS SEG NFR BLD: 46 % (ref 43–78)
NRBC # BLD: 0 K/UL (ref 0–0.2)
PLATELET # BLD AUTO: 343 K/UL (ref 150–450)
PMV BLD AUTO: 10 FL (ref 9.4–12.3)
POTASSIUM SERPL-SCNC: 4.2 MMOL/L (ref 3.5–5.1)
PROT SERPL-MCNC: 7 G/DL (ref 6.3–8.2)
RBC # BLD AUTO: 5.83 M/UL (ref 4.23–5.6)
SODIUM SERPL-SCNC: 133 MMOL/L (ref 136–145)
TRIGL SERPL-MCNC: 85 MG/DL (ref 0–150)
VLDLC SERPL CALC-MCNC: 17 MG/DL (ref 6–23)
WBC # BLD AUTO: 7.1 K/UL (ref 4.3–11.1)

## 2024-09-25 PROCEDURE — 3075F SYST BP GE 130 - 139MM HG: CPT | Performed by: FAMILY MEDICINE

## 2024-09-25 PROCEDURE — 3080F DIAST BP >= 90 MM HG: CPT | Performed by: FAMILY MEDICINE

## 2024-09-25 PROCEDURE — 99214 OFFICE O/P EST MOD 30 MIN: CPT | Performed by: FAMILY MEDICINE

## 2024-09-25 PROCEDURE — 3046F HEMOGLOBIN A1C LEVEL >9.0%: CPT | Performed by: FAMILY MEDICINE

## 2024-09-25 RX ORDER — METOPROLOL SUCCINATE 50 MG/1
100 TABLET, EXTENDED RELEASE ORAL DAILY
Qty: 180 TABLET | Refills: 3 | Status: SHIPPED | OUTPATIENT
Start: 2024-09-25 | End: 2025-09-20

## 2024-09-26 ASSESSMENT — ENCOUNTER SYMPTOMS
COLOR CHANGE: 0
PHOTOPHOBIA: 0
BLURRED VISION: 0
SHORTNESS OF BREATH: 0
COUGH: 0
BLOOD IN STOOL: 0
EYE PAIN: 0
ABDOMINAL DISTENTION: 0
ABDOMINAL PAIN: 0
NAUSEA: 0
SORE THROAT: 0

## 2024-11-20 ENCOUNTER — TELEPHONE (OUTPATIENT)
Dept: FAMILY MEDICINE CLINIC | Facility: CLINIC | Age: 49
End: 2024-11-20

## 2024-11-20 DIAGNOSIS — Z79.4 TYPE 2 DIABETES MELLITUS WITH HYPERGLYCEMIA, WITH LONG-TERM CURRENT USE OF INSULIN (HCC): Chronic | ICD-10-CM

## 2024-11-20 DIAGNOSIS — E11.65 TYPE 2 DIABETES MELLITUS WITH HYPERGLYCEMIA, WITH LONG-TERM CURRENT USE OF INSULIN (HCC): Chronic | ICD-10-CM

## 2024-11-20 DIAGNOSIS — Z86.711 HISTORY OF PULMONARY EMBOLISM: ICD-10-CM

## 2024-11-20 DIAGNOSIS — I10 ESSENTIAL HYPERTENSION: ICD-10-CM

## 2024-11-20 RX ORDER — AMLODIPINE AND VALSARTAN 10; 320 MG/1; MG/1
1 TABLET ORAL DAILY
Qty: 90 TABLET | Refills: 3 | Status: SHIPPED | OUTPATIENT
Start: 2024-11-20

## 2024-11-20 NOTE — TELEPHONE ENCOUNTER
Pt needs the following refills:    Amlodipine-valsartan  mg per tablet  Take 1 tablet by mouth daily    Apixaban (Eliquis) 5 mg tablet  Take 1 tablet by mouth 2x daily    Metformin (Glucophage) 500 mg tablet  Take 1 tablet by mouth 2x daily    Please use the [facility] pharmacy on file.    Thank you

## 2025-01-06 ENCOUNTER — OFFICE VISIT (OUTPATIENT)
Dept: FAMILY MEDICINE CLINIC | Facility: CLINIC | Age: 50
End: 2025-01-06
Payer: COMMERCIAL

## 2025-01-06 VITALS
DIASTOLIC BLOOD PRESSURE: 86 MMHG | HEIGHT: 70 IN | OXYGEN SATURATION: 97 % | SYSTOLIC BLOOD PRESSURE: 130 MMHG | TEMPERATURE: 98.2 F | HEART RATE: 110 BPM | WEIGHT: 227.4 LBS | BODY MASS INDEX: 32.56 KG/M2

## 2025-01-06 DIAGNOSIS — E55.9 VITAMIN D DEFICIENCY: ICD-10-CM

## 2025-01-06 DIAGNOSIS — Z86.711 HISTORY OF PULMONARY EMBOLUS (PE): ICD-10-CM

## 2025-01-06 DIAGNOSIS — E11.65 TYPE 2 DIABETES MELLITUS WITH HYPERGLYCEMIA, WITH LONG-TERM CURRENT USE OF INSULIN (HCC): Chronic | ICD-10-CM

## 2025-01-06 DIAGNOSIS — D50.8 OTHER IRON DEFICIENCY ANEMIA: Primary | ICD-10-CM

## 2025-01-06 DIAGNOSIS — G47.33 OSA (OBSTRUCTIVE SLEEP APNEA): ICD-10-CM

## 2025-01-06 DIAGNOSIS — Z79.4 TYPE 2 DIABETES MELLITUS WITH HYPERGLYCEMIA, WITH LONG-TERM CURRENT USE OF INSULIN (HCC): Chronic | ICD-10-CM

## 2025-01-06 DIAGNOSIS — E11.65 UNCONTROLLED TYPE 2 DIABETES MELLITUS WITH HYPERGLYCEMIA (HCC): ICD-10-CM

## 2025-01-06 DIAGNOSIS — R91.1 PULMONARY NODULE: ICD-10-CM

## 2025-01-06 DIAGNOSIS — E78.49 OTHER HYPERLIPIDEMIA: ICD-10-CM

## 2025-01-06 LAB
25(OH)D3 SERPL-MCNC: 17.7 NG/ML (ref 30–100)
ALBUMIN SERPL-MCNC: 3.7 G/DL (ref 3.5–5)
ALBUMIN/GLOB SERPL: 1.2 (ref 1–1.9)
ALP SERPL-CCNC: 117 U/L (ref 40–129)
ALT SERPL-CCNC: 22 U/L (ref 8–55)
ANION GAP SERPL CALC-SCNC: 11 MMOL/L (ref 7–16)
AST SERPL-CCNC: 20 U/L (ref 15–37)
BASOPHILS # BLD: 0 K/UL (ref 0–0.2)
BASOPHILS NFR BLD: 1 % (ref 0–2)
BILIRUB SERPL-MCNC: 0.3 MG/DL (ref 0–1.2)
BUN SERPL-MCNC: 13 MG/DL (ref 6–23)
CALCIUM SERPL-MCNC: 9.4 MG/DL (ref 8.8–10.2)
CHLORIDE SERPL-SCNC: 101 MMOL/L (ref 98–107)
CHOLEST SERPL-MCNC: 185 MG/DL (ref 0–200)
CO2 SERPL-SCNC: 24 MMOL/L (ref 20–29)
CREAT SERPL-MCNC: 0.88 MG/DL (ref 0.8–1.3)
CRP SERPL-MCNC: 1.3 MG/DL (ref 0–0.4)
DIFFERENTIAL METHOD BLD: ABNORMAL
EOSINOPHIL # BLD: 0.1 K/UL (ref 0–0.8)
EOSINOPHIL NFR BLD: 1 % (ref 0.5–7.8)
ERYTHROCYTE [DISTWIDTH] IN BLOOD BY AUTOMATED COUNT: 15 % (ref 11.9–14.6)
EST. AVERAGE GLUCOSE BLD GHB EST-MCNC: 306 MG/DL
FERRITIN SERPL-MCNC: 100 NG/ML (ref 8–388)
GLOBULIN SER CALC-MCNC: 3.1 G/DL (ref 2.3–3.5)
GLUCOSE SERPL-MCNC: 342 MG/DL (ref 70–99)
HBA1C MFR BLD: 12.3 % (ref 0–5.6)
HCT VFR BLD AUTO: 42.5 % (ref 41.1–50.3)
HDLC SERPL-MCNC: 52 MG/DL (ref 40–60)
HDLC SERPL: 3.6 (ref 0–5)
HGB BLD-MCNC: 14 G/DL (ref 13.6–17.2)
IMM GRANULOCYTES # BLD AUTO: 0 K/UL (ref 0–0.5)
IMM GRANULOCYTES NFR BLD AUTO: 0 % (ref 0–5)
IRON SERPL-MCNC: 73 UG/DL (ref 35–100)
LDLC SERPL CALC-MCNC: 117 MG/DL (ref 0–100)
LYMPHOCYTES # BLD: 1.8 K/UL (ref 0.5–4.6)
LYMPHOCYTES NFR BLD: 25 % (ref 13–44)
MCH RBC QN AUTO: 23.9 PG (ref 26.1–32.9)
MCHC RBC AUTO-ENTMCNC: 32.9 G/DL (ref 31.4–35)
MCV RBC AUTO: 72.5 FL (ref 82–102)
MONOCYTES # BLD: 0.5 K/UL (ref 0.1–1.3)
MONOCYTES NFR BLD: 8 % (ref 4–12)
NEUTS SEG # BLD: 4.5 K/UL (ref 1.7–8.2)
NEUTS SEG NFR BLD: 65 % (ref 43–78)
NRBC # BLD: 0 K/UL (ref 0–0.2)
PLATELET # BLD AUTO: 429 K/UL (ref 150–450)
PMV BLD AUTO: 9.4 FL (ref 9.4–12.3)
POTASSIUM SERPL-SCNC: 4.8 MMOL/L (ref 3.5–5.1)
PROT SERPL-MCNC: 6.8 G/DL (ref 6.3–8.2)
RBC # BLD AUTO: 5.86 M/UL (ref 4.23–5.6)
SODIUM SERPL-SCNC: 136 MMOL/L (ref 136–145)
TRIGL SERPL-MCNC: 81 MG/DL (ref 0–150)
VLDLC SERPL CALC-MCNC: 16 MG/DL (ref 6–23)
WBC # BLD AUTO: 7 K/UL (ref 4.3–11.1)

## 2025-01-06 PROCEDURE — 3075F SYST BP GE 130 - 139MM HG: CPT | Performed by: FAMILY MEDICINE

## 2025-01-06 PROCEDURE — 3079F DIAST BP 80-89 MM HG: CPT | Performed by: FAMILY MEDICINE

## 2025-01-06 PROCEDURE — 99214 OFFICE O/P EST MOD 30 MIN: CPT | Performed by: FAMILY MEDICINE

## 2025-01-06 PROCEDURE — 3046F HEMOGLOBIN A1C LEVEL >9.0%: CPT | Performed by: FAMILY MEDICINE

## 2025-01-06 RX ORDER — INSULIN GLARGINE 100 [IU]/ML
40 INJECTION, SOLUTION SUBCUTANEOUS NIGHTLY
Qty: 7 ADJUSTABLE DOSE PRE-FILLED PEN SYRINGE | Refills: 5 | Status: SHIPPED | OUTPATIENT
Start: 2025-01-06

## 2025-01-06 RX ORDER — INSULIN LISPRO 100 [IU]/ML
15 INJECTION, SOLUTION INTRAVENOUS; SUBCUTANEOUS
Qty: 10 ML | Refills: 5 | Status: SHIPPED | OUTPATIENT
Start: 2025-01-06

## 2025-01-06 ASSESSMENT — PATIENT HEALTH QUESTIONNAIRE - PHQ9
SUM OF ALL RESPONSES TO PHQ QUESTIONS 1-9: 0
5. POOR APPETITE OR OVEREATING: NOT AT ALL
3. TROUBLE FALLING OR STAYING ASLEEP: NOT AT ALL
1. LITTLE INTEREST OR PLEASURE IN DOING THINGS: NOT AT ALL
10. IF YOU CHECKED OFF ANY PROBLEMS, HOW DIFFICULT HAVE THESE PROBLEMS MADE IT FOR YOU TO DO YOUR WORK, TAKE CARE OF THINGS AT HOME, OR GET ALONG WITH OTHER PEOPLE: NOT DIFFICULT AT ALL
6. FEELING BAD ABOUT YOURSELF - OR THAT YOU ARE A FAILURE OR HAVE LET YOURSELF OR YOUR FAMILY DOWN: NOT AT ALL
SUM OF ALL RESPONSES TO PHQ QUESTIONS 1-9: 0
SUM OF ALL RESPONSES TO PHQ9 QUESTIONS 1 & 2: 0
8. MOVING OR SPEAKING SO SLOWLY THAT OTHER PEOPLE COULD HAVE NOTICED. OR THE OPPOSITE, BEING SO FIGETY OR RESTLESS THAT YOU HAVE BEEN MOVING AROUND A LOT MORE THAN USUAL: NOT AT ALL
9. THOUGHTS THAT YOU WOULD BE BETTER OFF DEAD, OR OF HURTING YOURSELF: NOT AT ALL
SUM OF ALL RESPONSES TO PHQ QUESTIONS 1-9: 0
7. TROUBLE CONCENTRATING ON THINGS, SUCH AS READING THE NEWSPAPER OR WATCHING TELEVISION: NOT AT ALL
SUM OF ALL RESPONSES TO PHQ QUESTIONS 1-9: 0
4. FEELING TIRED OR HAVING LITTLE ENERGY: NOT AT ALL
2. FEELING DOWN, DEPRESSED OR HOPELESS: NOT AT ALL

## 2025-01-07 ASSESSMENT — ENCOUNTER SYMPTOMS
PHOTOPHOBIA: 0
BLURRED VISION: 0
SORE THROAT: 0
EYE PAIN: 0
COLOR CHANGE: 0
ABDOMINAL PAIN: 0
ABDOMINAL DISTENTION: 0
COUGH: 0
BLOOD IN STOOL: 0
SHORTNESS OF BREATH: 0
NAUSEA: 0

## 2025-01-07 NOTE — PROGRESS NOTES
Bob Gonzales  1975 is a 49 y.o. male ,Established patient, here for evaluation of the following chief complaint(s):   Chief Complaint   Patient presents with    3 Month Follow-Up     Pt is not fasting-     Medication Refill     Would like to discuss the elquis and when he could stop it.          1. Other iron deficiency anemia  -     CBC with Auto Differential; Future  -     Iron; Future  -     Ferritin; Future  2. Type 2 diabetes mellitus with hyperglycemia, with long-term current use of insulin ([facility])--increase lantus to 40 units; humulin to 15 units tid.  -     insulin glargine (LANTUS SOLOSTAR) 100 UNIT/ML injection pen; Inject 40 Units into the skin nightly, Disp-7 Adjustable Dose Pre-filled Pen Syringe, R-5Normal  -     insulin lispro (HUMALOG,ADMELOG) 100 UNIT/ML SOLN injection vial; Inject 15 Units into the skin 3 times daily (before meals), Disp-10 mL, R-5Normal  -     CBC with Auto Differential; Future  -     Comprehensive Metabolic Panel; Future  -     Hemoglobin A1C; Future  3. History of pulmonary embolus (PE)--continue eliquis   -     C-Reactive Protein; Future  -     [facility] - Jared Cotton MD, Pulmonology, [place]  4. Vitamin D deficiency  -     Vitamin D 25 Hydroxy; Future  5. Other hyperlipidemia  -     Lipid Panel; Future  6. Pulmonary nodule  -     [facility] - Jared Cotton MD, Pulmonology, [place]  7. DICK (obstructive sleep apnea)  -     [facility] - Jared Cotton MD, Pulmonology, [place]  8. Uncontrolled type 2 diabetes mellitus with hyperglycemia (HCC)  -     [facility]        Return in about 3 months (around 4/6/2025).        Subjective   SUBJECTIVE/OBJECTIVE:  Medication Refill  This is a chronic problem. The current episode started more than 1 year ago. The problem occurs daily. The problem has been unchanged. Pertinent negatives include no abdominal pain, chest pain, chills, coughing, fever, headaches, joint swelling, myalgias, nausea, rash, sore

## 2025-01-27 DIAGNOSIS — E11.65 TYPE 2 DIABETES MELLITUS WITH HYPERGLYCEMIA, WITH LONG-TERM CURRENT USE OF INSULIN (HCC): Chronic | ICD-10-CM

## 2025-01-27 DIAGNOSIS — Z79.4 TYPE 2 DIABETES MELLITUS WITH HYPERGLYCEMIA, WITH LONG-TERM CURRENT USE OF INSULIN (HCC): Chronic | ICD-10-CM

## 2025-01-27 RX ORDER — INSULIN GLARGINE 100 [IU]/ML
40 INJECTION, SOLUTION SUBCUTANEOUS NIGHTLY
Qty: 7 ADJUSTABLE DOSE PRE-FILLED PEN SYRINGE | Refills: 5 | Status: SHIPPED | OUTPATIENT
Start: 2025-01-27

## 2025-02-07 ENCOUNTER — OFFICE VISIT (OUTPATIENT)
Dept: ENDOCRINOLOGY | Age: 50
End: 2025-02-07
Payer: COMMERCIAL

## 2025-02-07 VITALS
RESPIRATION RATE: 22 BRPM | BODY MASS INDEX: 33.21 KG/M2 | SYSTOLIC BLOOD PRESSURE: 158 MMHG | OXYGEN SATURATION: 98 % | WEIGHT: 232 LBS | HEIGHT: 70 IN | DIASTOLIC BLOOD PRESSURE: 96 MMHG | HEART RATE: 100 BPM

## 2025-02-07 DIAGNOSIS — E11.65 TYPE 2 DIABETES MELLITUS WITH HYPERGLYCEMIA, WITH LONG-TERM CURRENT USE OF INSULIN (HCC): Primary | Chronic | ICD-10-CM

## 2025-02-07 DIAGNOSIS — Z79.4 TYPE 2 DIABETES MELLITUS WITH HYPERGLYCEMIA, WITH LONG-TERM CURRENT USE OF INSULIN (HCC): Primary | Chronic | ICD-10-CM

## 2025-02-07 PROBLEM — A63.0 ANAL CONDYLOMA: Status: RESOLVED | Noted: 2022-11-15 | Resolved: 2025-02-07

## 2025-02-07 PROCEDURE — 3080F DIAST BP >= 90 MM HG: CPT | Performed by: NURSE PRACTITIONER

## 2025-02-07 PROCEDURE — 3046F HEMOGLOBIN A1C LEVEL >9.0%: CPT | Performed by: NURSE PRACTITIONER

## 2025-02-07 PROCEDURE — 3077F SYST BP >= 140 MM HG: CPT | Performed by: NURSE PRACTITIONER

## 2025-02-07 PROCEDURE — 99204 OFFICE O/P NEW MOD 45 MIN: CPT | Performed by: NURSE PRACTITIONER

## 2025-02-07 RX ORDER — INSULIN LISPRO 100 [IU]/ML
15 INJECTION, SOLUTION INTRAVENOUS; SUBCUTANEOUS
Qty: 42 ML | Refills: 3 | Status: SHIPPED | OUTPATIENT
Start: 2025-02-07

## 2025-02-07 RX ORDER — INSULIN GLARGINE 100 [IU]/ML
40 INJECTION, SOLUTION SUBCUTANEOUS NIGHTLY
Qty: 45 ML | Refills: 3 | Status: SHIPPED | OUTPATIENT
Start: 2025-02-07

## 2025-02-07 RX ORDER — HYDROCHLOROTHIAZIDE 12.5 MG/1
CAPSULE ORAL
Qty: 6 EACH | Refills: 3 | Status: SHIPPED | OUTPATIENT
Start: 2025-02-07

## 2025-02-07 ASSESSMENT — ENCOUNTER SYMPTOMS
DIARRHEA: 0
CONSTIPATION: 0

## 2025-02-07 NOTE — PROGRESS NOTES
[facility], APRN-CNP  [facility]  [address], [address]  [place] [address]    NOTICE FOR THE PATIENT: This clinical note is not designed to be interpreted by patients.  We do not recommend reading it unless you have medical training. These notes may contain candid and (unintentionally) offensive descriptions, which are sometimes required for accurate documentation. If you would like more information about your healthcare, please obtain it directly by myself or my staff/colleagues - never solely from the notes. Thank you for your understanding and cooperation.           Bob Gonzales is a 49 y.o. male seen at the request of Dr. Hurtado for the evaluation of type 2 diabetes mellitus.      ASSESSMENT AND PLAN:    1. Type 2 diabetes mellitus with hyperglycemia, with long-term current use of insulin ([facility])  Assessment & Plan:  A1c drawn 01/06/2024 was 12.3%. Glycemic control poor with A1c well above goal of less than 7%.   Since he saw his PCP in January, he has made significant changes to the way he eats. Reported fasting glucose are vastly improved since then. I don't feel we need to adjust his Lantus dose at all.   I don't have any pre-prandial data, so I'm not sure if his prandial insulin dose is appropriate. I didn't make any changes there either.   Will place CGM today. Cirilo 3 sample placed and rx for the same sent to pharmacy.   Referral to diabetic education placed.   We discussed sequela associated with uncontrolled diabetes including increased risk of stroke, heart attack, kidney failure, amputation, retinopathy, neuropathy, and nephropathy.  Patient was strongly encouraged to comply with treatment regimen as well as dietary and exercise recommendations to aid in glycemic control to help prevent complications associated with uncontrolled diabetes.    Orders:  -     [facility] - O Diabetic Education  -     Continuous Glucose Sensor (FREESTYLE CIRILO 3 PLUS SENSOR) MISC; Change

## 2025-02-07 NOTE — ASSESSMENT & PLAN NOTE
A1c drawn 01/06/2024 was 12.3%. Glycemic control poor with A1c well above goal of less than 7%.   Since he saw his PCP in January, he has made significant changes to the way he eats. Reported fasting glucose are vastly improved since then. I don't feel we need to adjust his Lantus dose at all.   I don't have any pre-prandial data, so I'm not sure if his prandial insulin dose is appropriate. I didn't make any changes there either.   Will place CGM today. Cirilo 3 sample placed and rx for the same sent to pharmacy.   We discussed sequela associated with uncontrolled diabetes including increased risk of stroke, heart attack, kidney failure, amputation, retinopathy, neuropathy, and nephropathy.  Patient was strongly encouraged to comply with treatment regimen as well as dietary and exercise recommendations to aid in glycemic control to help prevent complications associated with uncontrolled diabetes.    
done

## 2025-02-10 ENCOUNTER — TELEPHONE (OUTPATIENT)
Dept: DIABETES SERVICES | Age: 50
End: 2025-02-10

## 2025-02-10 NOTE — TELEPHONE ENCOUNTER
Type 2 . Called patient to tell them about our free diabetes education program. Unable to speak to patient. Left message for patient to call back at [phone] or [phone].

## 2025-02-11 ENCOUNTER — TELEPHONE (OUTPATIENT)
Dept: DIABETES SERVICES | Age: 50
End: 2025-02-11

## 2025-02-11 ENCOUNTER — CLINICAL DOCUMENTATION (OUTPATIENT)
Dept: FAMILY MEDICINE CLINIC | Facility: CLINIC | Age: 50
End: 2025-02-11

## 2025-02-11 NOTE — TELEPHONE ENCOUNTER
Type 2.  Patient called back about free Diabetes Education and left a message.  Called him back and he wants zooms due to a bad back.  Booked first Living and Coping 3-5-2025 at 7:30 AM.  [phone] or [phone].

## 2025-02-12 DIAGNOSIS — E11.65 TYPE 2 DIABETES MELLITUS WITH HYPERGLYCEMIA, WITH LONG-TERM CURRENT USE OF INSULIN (HCC): Chronic | ICD-10-CM

## 2025-02-12 DIAGNOSIS — Z79.4 TYPE 2 DIABETES MELLITUS WITH HYPERGLYCEMIA, WITH LONG-TERM CURRENT USE OF INSULIN (HCC): Chronic | ICD-10-CM

## 2025-02-12 RX ORDER — HYDROCHLOROTHIAZIDE 12.5 MG/1
CAPSULE ORAL
Qty: 6 EACH | Refills: 3 | OUTPATIENT
Start: 2025-02-12

## 2025-03-05 ENCOUNTER — HOSPITAL ENCOUNTER (OUTPATIENT)
Dept: DIABETES SERVICES | Age: 50
Setting detail: RECURRING SERIES
Discharge: HOME | End: 2025-03-08

## 2025-03-05 NOTE — PROGRESS NOTES
Participant attended Diabetes #1 session today titled : \"Understanding Diabetes\" via zoom. Topics included: Pathophysiology of Diabetes, Complications, Sleep Apnea, Diagnostic for diagnosis of Diabetes, A1C, Blood sugar target ranges, Glucometer use, Importance of blood sugar logs, Continuous Glucose Monitors, Sick day management, Hyperglycemia, Diabetic Ketoacidosis, Hypoglycemia, Emergency treatment of low blood sugars, and reviewed Diabetes Care team.    Written booklet/material provided    Encouraged lifestyle modifications and follow up with primary care provider.    He scheduled Living and Coping zoom for 3- via zoom.  He reports he has noticed when goes to the store and walking and in excruciating pain his blood sugars will go high.  He did eat breakfast and it had not been more than five hours without eating.  He asked if pain will elevated blood sugars? Instructed yes pain and any stressor can elevate blood sugars and that includes physical pain.            Certified Diabetes Care and   [facility]

## 2025-03-28 ENCOUNTER — OFFICE VISIT (OUTPATIENT)
Dept: ENDOCRINOLOGY | Age: 50
End: 2025-03-28
Payer: COMMERCIAL

## 2025-03-28 VITALS
BODY MASS INDEX: 33.73 KG/M2 | HEART RATE: 95 BPM | OXYGEN SATURATION: 100 % | DIASTOLIC BLOOD PRESSURE: 78 MMHG | WEIGHT: 235.6 LBS | HEIGHT: 70 IN | SYSTOLIC BLOOD PRESSURE: 122 MMHG

## 2025-03-28 DIAGNOSIS — Z79.4 TYPE 2 DIABETES MELLITUS WITH HYPERGLYCEMIA, WITH LONG-TERM CURRENT USE OF INSULIN (HCC): Primary | ICD-10-CM

## 2025-03-28 DIAGNOSIS — E11.65 TYPE 2 DIABETES MELLITUS WITH HYPERGLYCEMIA, WITH LONG-TERM CURRENT USE OF INSULIN (HCC): Primary | ICD-10-CM

## 2025-03-28 PROCEDURE — 99214 OFFICE O/P EST MOD 30 MIN: CPT | Performed by: NURSE PRACTITIONER

## 2025-03-28 PROCEDURE — 95251 CONT GLUC MNTR ANALYSIS I&R: CPT | Performed by: NURSE PRACTITIONER

## 2025-03-28 PROCEDURE — 3046F HEMOGLOBIN A1C LEVEL >9.0%: CPT | Performed by: NURSE PRACTITIONER

## 2025-03-28 PROCEDURE — 3078F DIAST BP <80 MM HG: CPT | Performed by: NURSE PRACTITIONER

## 2025-03-28 PROCEDURE — 3074F SYST BP LT 130 MM HG: CPT | Performed by: NURSE PRACTITIONER

## 2025-03-28 RX ORDER — TIRZEPATIDE 5 MG/.5ML
5 INJECTION, SOLUTION SUBCUTANEOUS
Qty: 6 ML | Refills: 1 | Status: SHIPPED | OUTPATIENT
Start: 2025-03-28

## 2025-03-28 RX ORDER — BUPRENORPHINE AND NALOXONE 8; 2 MG/1; MG/1
FILM, SOLUBLE BUCCAL; SUBLINGUAL
COMMUNITY
Start: 2025-03-27

## 2025-03-28 RX ORDER — TIRZEPATIDE 2.5 MG/.5ML
2.5 INJECTION, SOLUTION SUBCUTANEOUS
Qty: 2 ML | Refills: 0 | Status: SHIPPED | OUTPATIENT
Start: 2025-03-28

## 2025-03-28 ASSESSMENT — ENCOUNTER SYMPTOMS
DIARRHEA: 0
CONSTIPATION: 0

## 2025-03-28 NOTE — ASSESSMENT & PLAN NOTE
GMI: 7.9%. Glycemic control sub-optimal, but certainly better than his last A1c showed.   His fasting averages are near target. It doesn't look like his prandial insulin doses are quite appropriate. He has never tried an SGLT2i or GLP1. In lieu of adjusting insulin doses, we're going to try Mounjaro at 2.5 mg weekly for 4 weeks and then increase to 5 mg if tolerating. Patient was instructed regarding the common side effects of GI upset and early satiety as well as the less common associations with pancreatitis and C cell tumors.  Counseled that he needs to let me know if he starts having post-prandial hypoglycemia as I will need to adjust his Humalog dose.

## 2025-03-28 NOTE — PROGRESS NOTES
6.4 oz)     Body weight trend: increasing steadily    Physical Exam  Vitals reviewed.   Constitutional:       Appearance: Normal appearance.   Neck:      Thyroid: No thyroid mass, thyromegaly or thyroid tenderness.   Cardiovascular:      Rate and Rhythm: Normal rate and regular rhythm.      Heart sounds: Normal heart sounds.   Pulmonary:      Breath sounds: Normal breath sounds.   Lymphadenopathy:      Cervical: No cervical adenopathy.   Skin:     General: Skin is warm and dry.   Neurological:      General: No focal deficit present.      Mental Status: He is alert and oriented to person, place, and time.             JON Manzo            Portions of this note were generated with the assistance of voice recognition software.  As such, some errors in transcription may be present.

## 2025-03-31 ENCOUNTER — TELEPHONE (OUTPATIENT)
Dept: DIABETES SERVICES | Age: 50
End: 2025-03-31

## 2025-03-31 NOTE — TELEPHONE ENCOUNTER
Type 2.  Patient called and left message he has another appointment scheduled this morning that will interrupt our 7:30 AM session and wants to reschedule.  Called and left message with call back number to reschedule.  This was for Living and Coping Two.  He completed Living and Coping One.  He wants Nutrition via zoom too.   [phone]

## 2025-04-01 ENCOUNTER — TELEPHONE (OUTPATIENT)
Dept: DIABETES SERVICES | Age: 50
End: 2025-04-01

## 2025-04-01 NOTE — TELEPHONE ENCOUNTER
Patient called to reschedule an appointment. Called this AM but unable to speak to patient or leave a message.  I sent him an email to reschedule.  [phone]

## 2025-04-01 NOTE — TELEPHONE ENCOUNTER
Attempted several times during the day to call for reschedule for free Diabetes education.  Noted message was left yesterday after 5 PM to reschedule.  Just called at 5:40 PM. Still unable to speak to patient or leave a message at this call or any calls during the day today.  Attempted four times today at varies time.  [phone].

## 2025-04-02 ENCOUNTER — TELEPHONE (OUTPATIENT)
Dept: DIABETES SERVICES | Age: 50
End: 2025-04-02

## 2025-04-02 NOTE — TELEPHONE ENCOUNTER
Patient called back to reschedule free Diabetes education.  Wants AM again. Scheduled for 4-4-25 ay 7 AM via zoom. [phone].

## 2025-04-04 ENCOUNTER — HOSPITAL ENCOUNTER (OUTPATIENT)
Dept: DIABETES SERVICES | Age: 50
Setting detail: RECURRING SERIES
Discharge: HOME | End: 2025-04-07

## 2025-04-04 NOTE — PROGRESS NOTES
Participant attended Diabetes #2 session today via [employer], titled \"Managing Diabetes\": Topics include:  Controlling your Diabetes, Physical activity, Barriers to activity, Medications (oral and injectable, cost and mediation fatigue, Injectable medications including insulin and GLP-1 injectables, Insulin pens, Use of insulin syringe with vials, Drawing up insulin, Injections, Site location for injections, and rotation, Diabetes Distress, Stress, Depression, Diabetic foot care, Recommended vaccines, Diabetes Emergency Plan, and Problem solving.    Written booklet/material provided via email  Encouraged lifestyle modifications and follow-up with primary care provider.    He reports he just  started on Mounjaro and when he picked the medication. He did not look at frequency and took the four Mounjaro medication injections daily in a row.  He discovered what he did yesterday and called physician and left a message.  He did not take his Lantus last night until heard back from physician. He was concerned about lows. He reports his blood sugars have been around 140 mg/dl so far today. He said he now knows after education this morning why he is not hungry since taking all of the Mounjaro. He is force eating. He has no appetite at all.  Instructed to make sure he talks with doctors office today for guidance on his medications. Scheduled his first Nutrition session in May via [employer].       Certified Diabetes Care and   [facility]

## 2025-04-07 ENCOUNTER — OFFICE VISIT (OUTPATIENT)
Dept: FAMILY MEDICINE CLINIC | Facility: CLINIC | Age: 50
End: 2025-04-07
Payer: COMMERCIAL

## 2025-04-07 VITALS
HEART RATE: 118 BPM | SYSTOLIC BLOOD PRESSURE: 122 MMHG | HEIGHT: 70 IN | TEMPERATURE: 97.7 F | RESPIRATION RATE: 16 BRPM | BODY MASS INDEX: 31.92 KG/M2 | OXYGEN SATURATION: 98 % | DIASTOLIC BLOOD PRESSURE: 60 MMHG | WEIGHT: 223 LBS

## 2025-04-07 DIAGNOSIS — Z79.4 TYPE 2 DIABETES MELLITUS WITH HYPERGLYCEMIA, WITH LONG-TERM CURRENT USE OF INSULIN (HCC): Primary | ICD-10-CM

## 2025-04-07 DIAGNOSIS — E55.9 VITAMIN D DEFICIENCY: ICD-10-CM

## 2025-04-07 DIAGNOSIS — E11.65 TYPE 2 DIABETES MELLITUS WITH HYPERGLYCEMIA, WITH LONG-TERM CURRENT USE OF INSULIN (HCC): Primary | ICD-10-CM

## 2025-04-07 DIAGNOSIS — I10 PRIMARY HYPERTENSION: ICD-10-CM

## 2025-04-07 DIAGNOSIS — E78.49 OTHER HYPERLIPIDEMIA: ICD-10-CM

## 2025-04-07 DIAGNOSIS — I10 ESSENTIAL HYPERTENSION: ICD-10-CM

## 2025-04-07 LAB
25(OH)D3 SERPL-MCNC: 11.3 NG/ML (ref 30–100)
ALBUMIN SERPL-MCNC: 4.2 G/DL (ref 3.5–5)
ALBUMIN/GLOB SERPL: 1.3 (ref 1–1.9)
ALP SERPL-CCNC: 106 U/L (ref 40–129)
ALT SERPL-CCNC: 26 U/L (ref 8–55)
ANION GAP SERPL CALC-SCNC: 11 MMOL/L (ref 7–16)
AST SERPL-CCNC: 21 U/L (ref 15–37)
BASOPHILS # BLD: 0.02 K/UL (ref 0–0.2)
BASOPHILS NFR BLD: 0.2 % (ref 0–2)
BILIRUB SERPL-MCNC: 0.5 MG/DL (ref 0–1.2)
BUN SERPL-MCNC: 18 MG/DL (ref 6–23)
CALCIUM SERPL-MCNC: 9.8 MG/DL (ref 8.8–10.2)
CHLORIDE SERPL-SCNC: 101 MMOL/L (ref 98–107)
CHOLEST SERPL-MCNC: 180 MG/DL (ref 0–200)
CO2 SERPL-SCNC: 25 MMOL/L (ref 20–29)
CREAT SERPL-MCNC: 1.05 MG/DL (ref 0.8–1.3)
CREAT UR-MCNC: 321 MG/DL (ref 39–259)
DIFFERENTIAL METHOD BLD: ABNORMAL
EOSINOPHIL # BLD: 0.18 K/UL (ref 0–0.8)
EOSINOPHIL NFR BLD: 2.2 % (ref 0.5–7.8)
ERYTHROCYTE [DISTWIDTH] IN BLOOD BY AUTOMATED COUNT: 14.8 % (ref 11.9–14.6)
EST. AVERAGE GLUCOSE BLD GHB EST-MCNC: 217 MG/DL
GLOBULIN SER CALC-MCNC: 3.2 G/DL (ref 2.3–3.5)
GLUCOSE SERPL-MCNC: 127 MG/DL (ref 70–99)
HBA1C MFR BLD: 9.2 % (ref 0–5.6)
HCT VFR BLD AUTO: 43.6 % (ref 41.1–50.3)
HDLC SERPL-MCNC: 52 MG/DL (ref 40–60)
HDLC SERPL: 3.5 (ref 0–5)
HGB BLD-MCNC: 14.6 G/DL (ref 13.6–17.2)
IMM GRANULOCYTES # BLD AUTO: 0.02 K/UL (ref 0–0.5)
IMM GRANULOCYTES NFR BLD AUTO: 0.2 % (ref 0–5)
LDLC SERPL CALC-MCNC: 117 MG/DL (ref 0–100)
LYMPHOCYTES # BLD: 2.64 K/UL (ref 0.5–4.6)
LYMPHOCYTES NFR BLD: 32.5 % (ref 13–44)
MCH RBC QN AUTO: 24.3 PG (ref 26.1–32.9)
MCHC RBC AUTO-ENTMCNC: 33.5 G/DL (ref 31.4–35)
MCV RBC AUTO: 72.4 FL (ref 82–102)
MICROALBUMIN UR-MCNC: 4.08 MG/DL (ref 0–20)
MICROALBUMIN/CREAT UR-RTO: 13 MG/G (ref 0–30)
MONOCYTES # BLD: 0.92 K/UL (ref 0.1–1.3)
MONOCYTES NFR BLD: 11.3 % (ref 4–12)
NEUTS SEG # BLD: 4.35 K/UL (ref 1.7–8.2)
NEUTS SEG NFR BLD: 53.6 % (ref 43–78)
NRBC # BLD: 0 K/UL (ref 0–0.2)
PLATELET # BLD AUTO: 479 K/UL (ref 150–450)
PMV BLD AUTO: 9.5 FL (ref 9.4–12.3)
POTASSIUM SERPL-SCNC: 4.7 MMOL/L (ref 3.5–5.1)
PROT SERPL-MCNC: 7.3 G/DL (ref 6.3–8.2)
RBC # BLD AUTO: 6.02 M/UL (ref 4.23–5.6)
SODIUM SERPL-SCNC: 137 MMOL/L (ref 136–145)
TRIGL SERPL-MCNC: 56 MG/DL (ref 0–150)
VLDLC SERPL CALC-MCNC: 11 MG/DL (ref 6–23)
WBC # BLD AUTO: 8.1 K/UL (ref 4.3–11.1)

## 2025-04-07 PROCEDURE — 3046F HEMOGLOBIN A1C LEVEL >9.0%: CPT | Performed by: FAMILY MEDICINE

## 2025-04-07 PROCEDURE — 3078F DIAST BP <80 MM HG: CPT | Performed by: FAMILY MEDICINE

## 2025-04-07 PROCEDURE — 3074F SYST BP LT 130 MM HG: CPT | Performed by: FAMILY MEDICINE

## 2025-04-07 PROCEDURE — 99214 OFFICE O/P EST MOD 30 MIN: CPT | Performed by: FAMILY MEDICINE

## 2025-04-07 RX ORDER — HYDRALAZINE HYDROCHLORIDE 50 MG/1
50 TABLET, FILM COATED ORAL 4 TIMES DAILY
Qty: 1440 TABLET | Refills: 0 | Status: SHIPPED | OUTPATIENT
Start: 2025-04-07 | End: 2026-04-02

## 2025-04-07 RX ORDER — METOPROLOL SUCCINATE 50 MG/1
100 TABLET, EXTENDED RELEASE ORAL DAILY
Qty: 180 TABLET | Refills: 3 | Status: SHIPPED | OUTPATIENT
Start: 2025-04-07 | End: 2026-04-02

## 2025-04-07 RX ORDER — AMLODIPINE AND VALSARTAN 10; 320 MG/1; MG/1
1 TABLET ORAL DAILY
Qty: 90 TABLET | Refills: 3 | Status: SHIPPED | OUTPATIENT
Start: 2025-04-07

## 2025-04-08 ASSESSMENT — ENCOUNTER SYMPTOMS
ABDOMINAL PAIN: 0
COLOR CHANGE: 0
ABDOMINAL DISTENTION: 0
SHORTNESS OF BREATH: 0
BLOOD IN STOOL: 0
NAUSEA: 0
SORE THROAT: 0
EYE PAIN: 0
COUGH: 0
PHOTOPHOBIA: 0
BLURRED VISION: 0

## 2025-04-08 NOTE — PROGRESS NOTES
Bob Gonzales  1975 is a 49 y.o. male ,Established patient, here for evaluation of the following chief complaint(s):   Chief Complaint   Patient presents with    Follow-up     3 mos f/u          1. Type 2 diabetes mellitus with hyperglycemia, with long-term current use of insulin (HCC)  -     Albumin/Creatinine Ratio, Urine; Future  -     Hemoglobin A1C; Future  2. Essential hypertension  -     hydrALAZINE (APRESOLINE) 50 MG tablet; Take 1 tablet by mouth 4 times daily You can take this up to 4 times a day roughly every 6 hours if needed,, Disp-1440 tablet, R-0Normal  -     metoprolol succinate (TOPROL XL) 50 MG extended release tablet; Take 2 tablets by mouth daily, Disp-180 tablet, R-3Normal  -     amLODIPine-valsartan (EXFORGE)  MG per tablet; Take 1 tablet by mouth daily, Disp-90 tablet, R-3Normal  3. Primary hypertension  -     CBC with Auto Differential; Future  -     Comprehensive Metabolic Panel; Future  4. Other hyperlipidemia  -     Lipid Panel; Future  5. Vitamin D deficiency  -     Vitamin D 25 Hydroxy; Future  IF PROTEINURIA IS PRESENT , WILL REFER TO NEPHROLOGY    PSA DUE 7/5/25      Return in about 3 months (around 7/7/2025).        Subjective   SUBJECTIVE/OBJECTIVE:  Diabetes  He presents for his follow-up diabetic visit. He has type 2 diabetes mellitus. His disease course has been stable. Pertinent negatives for hypoglycemia include no confusion, dizziness, headaches, pallor or speech difficulty. Pertinent negatives for diabetes include no blurred vision, no chest pain, no fatigue and no weakness.   Hypertension  This is a chronic problem. The current episode started more than 1 year ago. The problem has been gradually improving since onset. Pertinent negatives include no anxiety, blurred vision, chest pain, headaches, palpitations, peripheral edema, PND or shortness of breath.       Review of Systems   Constitutional:  Negative for chills, fatigue and fever.   HENT:  Negative for ear

## 2025-04-09 ENCOUNTER — RESULTS FOLLOW-UP (OUTPATIENT)
Dept: FAMILY MEDICINE CLINIC | Facility: CLINIC | Age: 50
End: 2025-04-09

## 2025-04-09 RX ORDER — ERGOCALCIFEROL 1.25 MG/1
50000 CAPSULE, LIQUID FILLED ORAL WEEKLY
Qty: 4 CAPSULE | Refills: 5 | Status: SHIPPED | OUTPATIENT
Start: 2025-04-09

## 2025-04-23 DIAGNOSIS — E11.65 TYPE 2 DIABETES MELLITUS WITH HYPERGLYCEMIA, WITH LONG-TERM CURRENT USE OF INSULIN (HCC): ICD-10-CM

## 2025-04-23 DIAGNOSIS — Z79.4 TYPE 2 DIABETES MELLITUS WITH HYPERGLYCEMIA, WITH LONG-TERM CURRENT USE OF INSULIN (HCC): ICD-10-CM

## 2025-04-23 RX ORDER — TIRZEPATIDE 2.5 MG/.5ML
INJECTION, SOLUTION SUBCUTANEOUS
Qty: 4 ML | Refills: 0 | OUTPATIENT
Start: 2025-04-23

## 2025-04-23 RX ORDER — TIRZEPATIDE 5 MG/.5ML
5 INJECTION, SOLUTION SUBCUTANEOUS
Qty: 6 ML | Refills: 1 | Status: CANCELLED | OUTPATIENT
Start: 2025-04-23

## 2025-04-23 RX ORDER — TIRZEPATIDE 2.5 MG/.5ML
2.5 INJECTION, SOLUTION SUBCUTANEOUS
Qty: 2 ML | Refills: 0 | Status: CANCELLED | OUTPATIENT
Start: 2025-04-23

## 2025-04-26 ENCOUNTER — TELEPHONE (OUTPATIENT)
Dept: ENDOCRINOLOGY | Age: 50
End: 2025-04-26

## 2025-04-26 DIAGNOSIS — Z79.4 TYPE 2 DIABETES MELLITUS WITH HYPERGLYCEMIA, WITH LONG-TERM CURRENT USE OF INSULIN (HCC): ICD-10-CM

## 2025-04-26 DIAGNOSIS — E11.65 TYPE 2 DIABETES MELLITUS WITH HYPERGLYCEMIA, WITH LONG-TERM CURRENT USE OF INSULIN (HCC): ICD-10-CM

## 2025-04-26 RX ORDER — TIRZEPATIDE 2.5 MG/.5ML
2.5 INJECTION, SOLUTION SUBCUTANEOUS
Qty: 2 ML | Refills: 0 | Status: SHIPPED | OUTPATIENT
Start: 2025-04-26

## 2025-04-26 RX ORDER — TIRZEPATIDE 5 MG/.5ML
5 INJECTION, SOLUTION SUBCUTANEOUS
Qty: 6 ML | Refills: 1 | Status: SHIPPED | OUTPATIENT
Start: 2025-04-26

## 2025-04-26 NOTE — TELEPHONE ENCOUNTER
Patient called.  He has been off Mounjaro for a few weeks and needs Rx.  I will have him start over with the 2.5 mg dose for 4 weeks before moving to 5 mg since he had some nausea with the 2.5 mg dose the first time.

## 2025-04-30 DIAGNOSIS — M25.551 RIGHT HIP PAIN: Primary | ICD-10-CM

## 2025-05-21 DIAGNOSIS — Z86.711 HISTORY OF PULMONARY EMBOLISM: ICD-10-CM

## 2025-05-21 RX ORDER — APIXABAN 5 MG/1
5 TABLET, FILM COATED ORAL 2 TIMES DAILY
Qty: 180 TABLET | Refills: 0 | Status: SHIPPED | OUTPATIENT
Start: 2025-05-21

## 2025-05-31 ENCOUNTER — HOSPITAL ENCOUNTER (OUTPATIENT)
Dept: MRI IMAGING | Age: 50
End: 2025-05-31
Attending: FAMILY MEDICINE
Payer: COMMERCIAL

## 2025-05-31 DIAGNOSIS — M25.551 RIGHT HIP PAIN: ICD-10-CM

## 2025-05-31 PROCEDURE — 73721 MRI JNT OF LWR EXTRE W/O DYE: CPT

## 2025-06-03 ENCOUNTER — RESULTS FOLLOW-UP (OUTPATIENT)
Dept: FAMILY MEDICINE CLINIC | Facility: CLINIC | Age: 50
End: 2025-06-03

## 2025-06-06 ENCOUNTER — TELEPHONE (OUTPATIENT)
Dept: ORTHOPEDIC SURGERY | Age: 50
End: 2025-06-06

## 2025-06-06 DIAGNOSIS — M51.360 DEGENERATION OF INTERVERTEBRAL DISC OF LUMBAR REGION WITH DISCOGENIC BACK PAIN: Primary | ICD-10-CM

## 2025-07-03 DIAGNOSIS — Z86.711 HISTORY OF PULMONARY EMBOLISM: ICD-10-CM

## 2025-07-08 ENCOUNTER — OFFICE VISIT (OUTPATIENT)
Dept: ENDOCRINOLOGY | Age: 50
End: 2025-07-08
Payer: COMMERCIAL

## 2025-07-08 VITALS
HEIGHT: 70 IN | DIASTOLIC BLOOD PRESSURE: 78 MMHG | OXYGEN SATURATION: 97 % | BODY MASS INDEX: 32.75 KG/M2 | HEART RATE: 95 BPM | SYSTOLIC BLOOD PRESSURE: 120 MMHG | WEIGHT: 228.8 LBS

## 2025-07-08 DIAGNOSIS — E11.65 TYPE 2 DIABETES MELLITUS WITH HYPERGLYCEMIA, WITH LONG-TERM CURRENT USE OF INSULIN (HCC): Primary | ICD-10-CM

## 2025-07-08 DIAGNOSIS — Z79.4 TYPE 2 DIABETES MELLITUS WITH HYPERGLYCEMIA, WITH LONG-TERM CURRENT USE OF INSULIN (HCC): Primary | ICD-10-CM

## 2025-07-08 DIAGNOSIS — E55.9 VITAMIN D DEFICIENCY: ICD-10-CM

## 2025-07-08 LAB — HBA1C MFR BLD: 9.3 %

## 2025-07-08 PROCEDURE — 3046F HEMOGLOBIN A1C LEVEL >9.0%: CPT | Performed by: NURSE PRACTITIONER

## 2025-07-08 PROCEDURE — 99214 OFFICE O/P EST MOD 30 MIN: CPT | Performed by: NURSE PRACTITIONER

## 2025-07-08 PROCEDURE — 3074F SYST BP LT 130 MM HG: CPT | Performed by: NURSE PRACTITIONER

## 2025-07-08 PROCEDURE — 95251 CONT GLUC MNTR ANALYSIS I&R: CPT | Performed by: NURSE PRACTITIONER

## 2025-07-08 PROCEDURE — 83036 HEMOGLOBIN GLYCOSYLATED A1C: CPT | Performed by: NURSE PRACTITIONER

## 2025-07-08 PROCEDURE — 3078F DIAST BP <80 MM HG: CPT | Performed by: NURSE PRACTITIONER

## 2025-07-08 RX ORDER — ERGOCALCIFEROL 1.25 MG/1
50000 CAPSULE, LIQUID FILLED ORAL WEEKLY
Qty: 13 CAPSULE | Refills: 3 | Status: SHIPPED | OUTPATIENT
Start: 2025-07-08

## 2025-07-08 RX ORDER — TIRZEPATIDE 2.5 MG/.5ML
2.5 INJECTION, SOLUTION SUBCUTANEOUS
Qty: 2 ML | Refills: 1 | Status: SHIPPED | OUTPATIENT
Start: 2025-07-08

## 2025-07-08 RX ORDER — INSULIN LISPRO 100 [IU]/ML
5 INJECTION, SOLUTION INTRAVENOUS; SUBCUTANEOUS 3 TIMES DAILY
Qty: 18 ML | Refills: 3 | Status: SHIPPED | OUTPATIENT
Start: 2025-07-08

## 2025-07-08 ASSESSMENT — ENCOUNTER SYMPTOMS
DIARRHEA: 0
CONSTIPATION: 0

## 2025-07-08 NOTE — ASSESSMENT & PLAN NOTE
Experienced nausea after taking Mounjaro daily instead of weekly, which resolved after a few days. Will restart Mounjaro at 2.5 mg once a week for seven weeks. A prescription for an additional box of 2.5 mg pens has been sent to the pharmacy. If tolerated, the dose will be increased to 5 mg. Continues to take metformin 500 mg twice a day, Lantus 40 units daily, and Humalog 5 units with meals.  Feet appear dry, some reduced sensation to right great toe. Patient reports seeing diabetic-related skin issues on feet. Does not have a podiatrist and is interested in seeing one.

## 2025-07-08 NOTE — ASSESSMENT & PLAN NOTE
Vitamin D levels are significantly low. Advised to take vitamin D 50,000 units once a week. If this causes discomfort, he can switch to a daily dose of 2000 units. A new prescription for vitamin D has been sent to the pharmacy.

## 2025-07-10 ENCOUNTER — OFFICE VISIT (OUTPATIENT)
Dept: FAMILY MEDICINE CLINIC | Facility: CLINIC | Age: 50
End: 2025-07-10
Payer: COMMERCIAL

## 2025-07-10 VITALS
DIASTOLIC BLOOD PRESSURE: 80 MMHG | OXYGEN SATURATION: 98 % | HEART RATE: 87 BPM | SYSTOLIC BLOOD PRESSURE: 118 MMHG | BODY MASS INDEX: 32.93 KG/M2 | TEMPERATURE: 98 F | WEIGHT: 230 LBS | HEIGHT: 70 IN

## 2025-07-10 DIAGNOSIS — E55.9 VITAMIN D DEFICIENCY: ICD-10-CM

## 2025-07-10 DIAGNOSIS — Z79.4 TYPE 2 DIABETES MELLITUS WITH HYPERGLYCEMIA, WITH LONG-TERM CURRENT USE OF INSULIN (HCC): Chronic | ICD-10-CM

## 2025-07-10 DIAGNOSIS — N40.1 BENIGN PROSTATIC HYPERPLASIA WITH INCOMPLETE BLADDER EMPTYING: ICD-10-CM

## 2025-07-10 DIAGNOSIS — B35.1 ONYCHOMYCOSIS: ICD-10-CM

## 2025-07-10 DIAGNOSIS — R39.14 BENIGN PROSTATIC HYPERPLASIA WITH INCOMPLETE BLADDER EMPTYING: ICD-10-CM

## 2025-07-10 DIAGNOSIS — E78.49 OTHER HYPERLIPIDEMIA: ICD-10-CM

## 2025-07-10 DIAGNOSIS — N40.0 ENLARGED PROSTATE: ICD-10-CM

## 2025-07-10 DIAGNOSIS — E11.65 TYPE 2 DIABETES MELLITUS WITH HYPERGLYCEMIA, WITH LONG-TERM CURRENT USE OF INSULIN (HCC): Chronic | ICD-10-CM

## 2025-07-10 DIAGNOSIS — I10 PRIMARY HYPERTENSION: Primary | ICD-10-CM

## 2025-07-10 DIAGNOSIS — L30.1 DYSHIDROTIC FOOT DERMATITIS: ICD-10-CM

## 2025-07-10 LAB
25(OH)D3 SERPL-MCNC: 16.9 NG/ML (ref 30–100)
ALBUMIN SERPL-MCNC: 3.7 G/DL (ref 3.5–5)
ALBUMIN/GLOB SERPL: 1.2 (ref 1–1.9)
ALP SERPL-CCNC: 110 U/L (ref 40–129)
ALT SERPL-CCNC: 30 U/L (ref 8–55)
ANION GAP SERPL CALC-SCNC: 10 MMOL/L (ref 7–16)
AST SERPL-CCNC: 24 U/L (ref 15–37)
BASOPHILS # BLD: 0.04 K/UL (ref 0–0.2)
BASOPHILS NFR BLD: 0.5 % (ref 0–2)
BILIRUB SERPL-MCNC: 0.3 MG/DL (ref 0–1.2)
BILIRUBIN, URINE, POC: NEGATIVE
BLOOD URINE, POC: NEGATIVE
BUN SERPL-MCNC: 13 MG/DL (ref 6–23)
CALCIUM SERPL-MCNC: 9.8 MG/DL (ref 8.8–10.2)
CHLORIDE SERPL-SCNC: 103 MMOL/L (ref 98–107)
CHOLEST SERPL-MCNC: 173 MG/DL (ref 0–200)
CO2 SERPL-SCNC: 28 MMOL/L (ref 20–29)
CREAT SERPL-MCNC: 0.89 MG/DL (ref 0.8–1.3)
DIFFERENTIAL METHOD BLD: ABNORMAL
EOSINOPHIL # BLD: 0.35 K/UL (ref 0–0.8)
EOSINOPHIL NFR BLD: 4.4 % (ref 0.5–7.8)
ERYTHROCYTE [DISTWIDTH] IN BLOOD BY AUTOMATED COUNT: 14.6 % (ref 11.9–14.6)
EST. AVERAGE GLUCOSE BLD GHB EST-MCNC: 214 MG/DL
GLOBULIN SER CALC-MCNC: 3.2 G/DL (ref 2.3–3.5)
GLUCOSE SERPL-MCNC: 120 MG/DL (ref 70–99)
GLUCOSE URINE, POC: 100
HBA1C MFR BLD: 9.1 % (ref 0–5.6)
HCT VFR BLD AUTO: 39.9 % (ref 41.1–50.3)
HDLC SERPL-MCNC: 54 MG/DL (ref 40–60)
HDLC SERPL: 3.2 (ref 0–5)
HGB BLD-MCNC: 13.1 G/DL (ref 13.6–17.2)
IMM GRANULOCYTES # BLD AUTO: 0.06 K/UL (ref 0–0.5)
IMM GRANULOCYTES NFR BLD AUTO: 0.7 % (ref 0–5)
KETONES, URINE, POC: NEGATIVE
LDLC SERPL CALC-MCNC: 106 MG/DL (ref 0–100)
LEUKOCYTE ESTERASE, URINE, POC: NEGATIVE
LYMPHOCYTES # BLD: 2.86 K/UL (ref 0.5–4.6)
LYMPHOCYTES NFR BLD: 35.6 % (ref 13–44)
MCH RBC QN AUTO: 24.7 PG (ref 26.1–32.9)
MCHC RBC AUTO-ENTMCNC: 32.8 G/DL (ref 31.4–35)
MCV RBC AUTO: 75.3 FL (ref 82–102)
MONOCYTES # BLD: 0.82 K/UL (ref 0.1–1.3)
MONOCYTES NFR BLD: 10.2 % (ref 4–12)
NEUTS SEG # BLD: 3.9 K/UL (ref 1.7–8.2)
NEUTS SEG NFR BLD: 48.6 % (ref 43–78)
NITRITE, URINE, POC: NEGATIVE
NRBC # BLD: 0 K/UL (ref 0–0.2)
PH, URINE, POC: 6 (ref 4.6–8)
PLATELET # BLD AUTO: 389 K/UL (ref 150–450)
PMV BLD AUTO: 9.5 FL (ref 9.4–12.3)
POTASSIUM SERPL-SCNC: 4.4 MMOL/L (ref 3.5–5.1)
PROT SERPL-MCNC: 6.9 G/DL (ref 6.3–8.2)
PROTEIN,URINE, POC: ABNORMAL
PSA SERPL-MCNC: 2.1 NG/ML (ref 0–4)
RBC # BLD AUTO: 5.3 M/UL (ref 4.23–5.6)
SODIUM SERPL-SCNC: 141 MMOL/L (ref 136–145)
SPECIFIC GRAVITY, URINE, POC: 1.02 (ref 1–1.03)
TRIGL SERPL-MCNC: 66 MG/DL (ref 0–150)
URINALYSIS CLARITY, POC: CLEAR
URINALYSIS COLOR, POC: YELLOW
UROBILINOGEN, POC: ABNORMAL
VLDLC SERPL CALC-MCNC: 13 MG/DL (ref 6–23)
WBC # BLD AUTO: 8 K/UL (ref 4.3–11.1)

## 2025-07-10 PROCEDURE — 3079F DIAST BP 80-89 MM HG: CPT | Performed by: FAMILY MEDICINE

## 2025-07-10 PROCEDURE — 99214 OFFICE O/P EST MOD 30 MIN: CPT | Performed by: FAMILY MEDICINE

## 2025-07-10 PROCEDURE — 3046F HEMOGLOBIN A1C LEVEL >9.0%: CPT | Performed by: FAMILY MEDICINE

## 2025-07-10 PROCEDURE — 81003 URINALYSIS AUTO W/O SCOPE: CPT | Performed by: FAMILY MEDICINE

## 2025-07-10 PROCEDURE — 3074F SYST BP LT 130 MM HG: CPT | Performed by: FAMILY MEDICINE

## 2025-07-10 SDOH — ECONOMIC STABILITY: FOOD INSECURITY: WITHIN THE PAST 12 MONTHS, YOU WORRIED THAT YOUR FOOD WOULD RUN OUT BEFORE YOU GOT MONEY TO BUY MORE.: NEVER TRUE

## 2025-07-10 SDOH — ECONOMIC STABILITY: FOOD INSECURITY: WITHIN THE PAST 12 MONTHS, THE FOOD YOU BOUGHT JUST DIDN'T LAST AND YOU DIDN'T HAVE MONEY TO GET MORE.: NEVER TRUE

## 2025-07-12 ASSESSMENT — ENCOUNTER SYMPTOMS
BLURRED VISION: 0
NAUSEA: 0
PHOTOPHOBIA: 0
EYE PAIN: 0
SHORTNESS OF BREATH: 0
COUGH: 0
SORE THROAT: 0
BLOOD IN STOOL: 0
ABDOMINAL DISTENTION: 0
COLOR CHANGE: 0
ABDOMINAL PAIN: 0

## 2025-07-30 DIAGNOSIS — M54.9 SPINAL COLUMN PAIN: Primary | ICD-10-CM

## 2025-07-30 DIAGNOSIS — M54.50 CHRONIC BILATERAL LOW BACK PAIN WITHOUT SCIATICA: ICD-10-CM

## 2025-07-30 DIAGNOSIS — G89.29 CHRONIC BILATERAL LOW BACK PAIN WITHOUT SCIATICA: ICD-10-CM

## 2025-08-04 DIAGNOSIS — M54.12 CERVICAL RADICULOPATHY: Primary | ICD-10-CM

## 2025-08-04 DIAGNOSIS — M51.16 LUMBAR DISC DISEASE WITH RADICULOPATHY: ICD-10-CM
